# Patient Record
Sex: MALE | Race: WHITE | NOT HISPANIC OR LATINO | URBAN - NONMETROPOLITAN AREA
[De-identification: names, ages, dates, MRNs, and addresses within clinical notes are randomized per-mention and may not be internally consistent; named-entity substitution may affect disease eponyms.]

---

## 2023-12-04 DIAGNOSIS — F41.8 ANXIETY WITH DEPRESSION: ICD-10-CM

## 2023-12-05 RX ORDER — BUPROPION HYDROCHLORIDE 150 MG/1
150 TABLET ORAL
Qty: 90 TABLET | Refills: 3 | Status: SHIPPED | OUTPATIENT
Start: 2023-12-05 | End: 2024-06-10 | Stop reason: ALTCHOICE

## 2023-12-11 ENCOUNTER — OFFICE VISIT (OUTPATIENT)
Dept: PRIMARY CARE | Facility: CLINIC | Age: 53
End: 2023-12-11
Payer: COMMERCIAL

## 2023-12-11 VITALS
HEART RATE: 78 BPM | HEIGHT: 70 IN | WEIGHT: 182.4 LBS | BODY MASS INDEX: 26.11 KG/M2 | TEMPERATURE: 98 F | OXYGEN SATURATION: 97 % | DIASTOLIC BLOOD PRESSURE: 78 MMHG | SYSTOLIC BLOOD PRESSURE: 128 MMHG

## 2023-12-11 DIAGNOSIS — R53.83 TIRED: ICD-10-CM

## 2023-12-11 DIAGNOSIS — Z13.6 SCREENING FOR HEART DISEASE: ICD-10-CM

## 2023-12-11 DIAGNOSIS — J01.00 ACUTE NON-RECURRENT MAXILLARY SINUSITIS: Primary | ICD-10-CM

## 2023-12-11 PROCEDURE — 99214 OFFICE O/P EST MOD 30 MIN: CPT | Performed by: FAMILY MEDICINE

## 2023-12-11 PROCEDURE — 1036F TOBACCO NON-USER: CPT | Performed by: FAMILY MEDICINE

## 2023-12-11 RX ORDER — ONDANSETRON 4 MG/1
4 TABLET, ORALLY DISINTEGRATING ORAL EVERY 8 HOURS PRN
COMMUNITY
Start: 2023-11-02

## 2023-12-11 RX ORDER — TAMSULOSIN HYDROCHLORIDE 0.4 MG/1
0.4 CAPSULE ORAL NIGHTLY
COMMUNITY

## 2023-12-11 RX ORDER — FERROUS SULFATE 325(65) MG
325 TABLET ORAL
COMMUNITY

## 2023-12-11 RX ORDER — TADALAFIL 20 MG/1
20 TABLET ORAL DAILY PRN
COMMUNITY
End: 2024-06-10 | Stop reason: SDUPTHER

## 2023-12-11 RX ORDER — OLMESARTAN MEDOXOMIL AND HYDROCHLOROTHIAZIDE 40/25 40; 25 MG/1; MG/1
1 TABLET ORAL
COMMUNITY
Start: 2017-11-06 | End: 2024-02-07 | Stop reason: ALTCHOICE

## 2023-12-11 RX ORDER — AZITHROMYCIN 500 MG/1
500 TABLET, FILM COATED ORAL DAILY
Qty: 5 TABLET | Refills: 0 | Status: SHIPPED | OUTPATIENT
Start: 2023-12-11 | End: 2023-12-16

## 2023-12-11 ASSESSMENT — ENCOUNTER SYMPTOMS
SINUS PRESSURE: 1
ENDOCRINE NEGATIVE: 1
LIGHT-HEADEDNESS: 1
DIFFICULTY URINATING: 0
SINUS PAIN: 1
DIZZINESS: 0
FATIGUE: 1
FEVER: 0
SHORTNESS OF BREATH: 0
NAUSEA: 0
RHINORRHEA: 1
DIARRHEA: 0

## 2023-12-11 ASSESSMENT — PATIENT HEALTH QUESTIONNAIRE - PHQ9
2. FEELING DOWN, DEPRESSED OR HOPELESS: NOT AT ALL
SUM OF ALL RESPONSES TO PHQ9 QUESTIONS 1 AND 2: 0
1. LITTLE INTEREST OR PLEASURE IN DOING THINGS: NOT AT ALL

## 2023-12-11 ASSESSMENT — PAIN SCALES - GENERAL: PAINLEVEL: 0-NO PAIN

## 2023-12-11 NOTE — PROGRESS NOTES
"Subjective   Patient ID: Marlon Stoddard is a 53 y.o. male who presents for Dizziness, Hypotension (Happened 1 time while going from a kneeling to a standing position-lightheaded, dizziness-low BP 3 weeks ago/Patient has not been taking his BP med's for 3 weeks), and Sinusitis (X 3 days).    He was taking BP meds in past, now Blood pressure was running low with dizziness etc.  He is stable without those meds.  He did stop EtOH also.  He looks to be in great shape and seems to be in great spirits  Tired  No major depression  Nasal congestion, post  nasal drip, sinus symptoms over the last week         Review of Systems   Constitutional:  Positive for fatigue. Negative for fever.        Also see HPI   HENT:  Positive for congestion, postnasal drip, rhinorrhea, sinus pressure and sinus pain.    Eyes:  Negative for visual disturbance.   Respiratory:  Negative for shortness of breath.    Cardiovascular:  Negative for chest pain.   Gastrointestinal:  Negative for diarrhea and nausea.   Endocrine: Negative.    Genitourinary:  Negative for difficulty urinating.   Skin:  Negative for rash.   Neurological:  Positive for light-headedness. Negative for dizziness.        No focal deficits   Psychiatric/Behavioral:  Negative for suicidal ideas.    All other systems reviewed and are negative.      Objective   /78   Pulse 78   Temp 36.7 °C (98 °F)   Ht 1.778 m (5' 10\")   Wt 82.7 kg (182 lb 6.4 oz)   SpO2 97%   BMI 26.17 kg/m²     Physical Exam  Vitals and nursing note reviewed.   Constitutional:       Appearance: Normal appearance.   HENT:      Head: Normocephalic and atraumatic.      Comments: Maxillary sinus tenderness bilaterally     Nose: Congestion and rhinorrhea present.   Eyes:      Extraocular Movements: Extraocular movements intact.      Conjunctiva/sclera: Conjunctivae normal.   Cardiovascular:      Rate and Rhythm: Normal rate and regular rhythm.      Heart sounds: Normal heart sounds.   Pulmonary:      " Effort: Pulmonary effort is normal.      Breath sounds: Normal breath sounds.      Comments: Lungs essentially CTA b/l  Abdominal:      General: There is no distension.      Palpations: Abdomen is soft. There is no mass.      Tenderness: There is no abdominal tenderness.   Musculoskeletal:      Cervical back: Neck supple.      Right lower leg: No edema.      Left lower leg: No edema.   Skin:     Coloration: Skin is not jaundiced.      Findings: No rash.   Neurological:      General: No focal deficit present.      Mental Status: He is alert and oriented to person, place, and time.   Psychiatric:         Mood and Affect: Mood normal.         Behavior: Behavior normal.         Thought Content: Thought content normal.         Judgment: Judgment normal.         Assessment/Plan   Problem List Items Addressed This Visit    None  Visit Diagnoses         Codes    Acute non-recurrent maxillary sinusitis    -  Primary J01.00    Relevant Medications    azithromycin (Zithromax) 500 mg tablet    Screening for heart disease     Z13.6    Relevant Orders    Lipid Panel    Tired     R53.83    Relevant Orders    Comprehensive Metabolic Panel    TSH with reflex to Free T4 if abnormal    Vitamin B12    CBC and Auto Differential

## 2023-12-29 ENCOUNTER — LAB (OUTPATIENT)
Dept: LAB | Facility: LAB | Age: 53
End: 2023-12-29
Payer: COMMERCIAL

## 2023-12-29 DIAGNOSIS — R53.83 TIRED: ICD-10-CM

## 2023-12-29 DIAGNOSIS — D50.0 IRON DEFICIENCY ANEMIA DUE TO CHRONIC BLOOD LOSS: ICD-10-CM

## 2023-12-29 DIAGNOSIS — Z13.6 SCREENING FOR HEART DISEASE: ICD-10-CM

## 2023-12-29 LAB
ALBUMIN SERPL BCP-MCNC: 4.2 G/DL (ref 3.4–5)
ALP SERPL-CCNC: 84 U/L (ref 33–120)
ALT SERPL W P-5'-P-CCNC: 17 U/L (ref 10–52)
ANION GAP SERPL CALC-SCNC: 10 MMOL/L (ref 10–20)
AST SERPL W P-5'-P-CCNC: 13 U/L (ref 9–39)
BASOPHILS # BLD AUTO: 0.09 X10*3/UL (ref 0–0.1)
BASOPHILS NFR BLD AUTO: 1.6 %
BILIRUB SERPL-MCNC: 0.5 MG/DL (ref 0–1.2)
BUN SERPL-MCNC: 12 MG/DL (ref 6–23)
CALCIUM SERPL-MCNC: 9.1 MG/DL (ref 8.6–10.6)
CHLORIDE SERPL-SCNC: 105 MMOL/L (ref 98–107)
CHOLEST SERPL-MCNC: 193 MG/DL (ref 0–199)
CHOLESTEROL/HDL RATIO: 3.6
CO2 SERPL-SCNC: 31 MMOL/L (ref 21–32)
CREAT SERPL-MCNC: 0.87 MG/DL (ref 0.5–1.3)
EOSINOPHIL # BLD AUTO: 0.55 X10*3/UL (ref 0–0.7)
EOSINOPHIL NFR BLD AUTO: 9.6 %
ERYTHROCYTE [DISTWIDTH] IN BLOOD BY AUTOMATED COUNT: 13.5 % (ref 11.5–14.5)
GFR SERPL CREATININE-BSD FRML MDRD: >90 ML/MIN/1.73M*2
GLUCOSE SERPL-MCNC: 97 MG/DL (ref 74–99)
HCT VFR BLD AUTO: 38.9 % (ref 41–52)
HDLC SERPL-MCNC: 53.3 MG/DL
HGB BLD-MCNC: 12.6 G/DL (ref 13.5–17.5)
IMM GRANULOCYTES # BLD AUTO: 0.01 X10*3/UL (ref 0–0.7)
IMM GRANULOCYTES NFR BLD AUTO: 0.2 % (ref 0–0.9)
LDLC SERPL CALC-MCNC: 121 MG/DL
LYMPHOCYTES # BLD AUTO: 1.76 X10*3/UL (ref 1.2–4.8)
LYMPHOCYTES NFR BLD AUTO: 30.8 %
MCH RBC QN AUTO: 26.5 PG (ref 26–34)
MCHC RBC AUTO-ENTMCNC: 32.4 G/DL (ref 32–36)
MCV RBC AUTO: 82 FL (ref 80–100)
MONOCYTES # BLD AUTO: 0.52 X10*3/UL (ref 0.1–1)
MONOCYTES NFR BLD AUTO: 9.1 %
NEUTROPHILS # BLD AUTO: 2.79 X10*3/UL (ref 1.2–7.7)
NEUTROPHILS NFR BLD AUTO: 48.7 %
NON HDL CHOLESTEROL: 140 MG/DL (ref 0–149)
NRBC BLD-RTO: 0 /100 WBCS (ref 0–0)
PLATELET # BLD AUTO: 269 X10*3/UL (ref 150–450)
POTASSIUM SERPL-SCNC: 3.5 MMOL/L (ref 3.5–5.3)
PROT SERPL-MCNC: 6.2 G/DL (ref 6.4–8.2)
RBC # BLD AUTO: 4.75 X10*6/UL (ref 4.5–5.9)
SODIUM SERPL-SCNC: 142 MMOL/L (ref 136–145)
TRIGL SERPL-MCNC: 94 MG/DL (ref 0–149)
TSH SERPL-ACNC: 1.66 MIU/L (ref 0.44–3.98)
VIT B12 SERPL-MCNC: 360 PG/ML (ref 211–911)
VLDL: 19 MG/DL (ref 0–40)
WBC # BLD AUTO: 5.7 X10*3/UL (ref 4.4–11.3)

## 2023-12-29 PROCEDURE — 84443 ASSAY THYROID STIM HORMONE: CPT

## 2023-12-29 PROCEDURE — 82607 VITAMIN B-12: CPT

## 2023-12-29 PROCEDURE — 80061 LIPID PANEL: CPT

## 2023-12-29 PROCEDURE — 80053 COMPREHEN METABOLIC PANEL: CPT

## 2023-12-29 PROCEDURE — 36415 COLL VENOUS BLD VENIPUNCTURE: CPT

## 2023-12-29 PROCEDURE — 85025 COMPLETE CBC W/AUTO DIFF WBC: CPT

## 2024-02-06 NOTE — PROGRESS NOTES
patient ID: Marlon Stoddard is a 53 y.o. male.    Subjective    HPI  Marlon Stoddard follows up for mild normocytic anemia and fatigue. Laboratory work at the last visit revealed an elevated TIBC suggestive of iron deficiency. Patient denies pagophagia but does have fatigue, chills, and does bite his nails. Fatigue is better but suboptimal    He denies mouth sores. Patient reports a family history of iron deficiency.   He denies diarrhea or bloody stools but does have a history of gastric ulcers.   Tolerating oral iron.      53-year-old man with a history of hypertension, generalized anxiety disorder, degenerative disc disease, vitamin D deficiency, erectile dysfunction due to arterial  insufficiency, gastritis, chronic pain, overweight, referred for evaluation of anemia.     Chief complaint: Fatigue  He presents with a history of chills, which are very intense.  He even saw a neurologist to evaluate for seizures, because of shaking.  He knows when these shakes are coming.  He has a warning of approximately five seconds, and the shakes will last 1-5  minutes.  He has no loss of consciousness when the shakes occur.  The shakes involve his whole body.  His legs will get cold.  His wife reports that he is not cold to the touch.      the most frequent have been 2-3 attacks of chills per day.  He notes that dampness will bring on these chill attacks.  Atmospheric temperature changes affects it.    He notes that he can have some itching, though more like numbness, but no rash develops.  He has no flushing (except if drinking rum, though not with eating cheese).    He has no allergies and no runny nose.       3/7/2023: Hemoglobin 12.7, hematocrit 38.6, MCV 81.3 normal white count platelet count and differential   Sed rate 6(2/1/2020)        97, TIBC 348, 34.5% iron saturation   B12 401   12/10/2022: Hemoglobin 12.3, hematocrit 38.6, RBC 4.69, normal white blood count platelet count and differential   9/2/2022: Hemoglobin 12.3,  hematocrit 37.6   PSA 1.6   Uric acid 6.6   Vitamin D 32     First available CBC: 1/18/2017: Hemoglobin 13.4   Anemia has always been present since then   LFTs normal   Creatinine 0.9   PEBBLES(2/1/2020) normal        Past surgical history:   Tonsillectomy   Bunionectomy   Vasectomy   Back disc at C6(1/27/2017)    Objective    BSA: There is no height or weight on file to calculate BSA.  There were no vitals taken for this visit.   Review of Systems   Constitutional:  Positive for chills and fatigue. Negative for fever.   HENT:  Negative for nosebleeds.    Eyes:  Negative for visual disturbance.   Respiratory:  Positive for shortness of breath. Negative for cough.    Gastrointestinal:  Negative for blood in stool, constipation and diarrhea.   Endocrine: Positive for cold intolerance.   Genitourinary:  Negative for hematuria.   Musculoskeletal:  Negative for arthralgias.   Skin:  Negative for rash.   Allergic/Immunologic: Negative for environmental allergies.   Neurological:  Negative for light-headedness.   Hematological:  Does not bruise/bleed easily.   Psychiatric/Behavioral:  Negative for sleep disturbance.        Physical Exam  Constitutional:       Appearance: Normal appearance.   HENT:      Head: Normocephalic and atraumatic.      Right Ear: External ear normal.      Left Ear: External ear normal.      Mouth/Throat:      Mouth: Mucous membranes are moist.      Pharynx: Oropharynx is clear.   Eyes:      Extraocular Movements: Extraocular movements intact.      Pupils: Pupils are equal, round, and reactive to light.   Cardiovascular:      Rate and Rhythm: Normal rate and regular rhythm.      Heart sounds: No murmur heard.     No friction rub. No gallop.   Pulmonary:      Effort: Pulmonary effort is normal.      Breath sounds: Normal breath sounds.   Abdominal:      General: Abdomen is flat. Bowel sounds are normal.   Musculoskeletal:         General: Normal range of motion.      Cervical back: Normal range of motion.  "  Skin:     General: Skin is warm and dry.   Neurological:      General: No focal deficit present.      Mental Status: He is alert and oriented to person, place, and time.   Psychiatric:         Mood and Affect: Mood normal.         Behavior: Behavior normal.             Assessment/Plan   SUMMARY: 53-year-old man with a history of hypertension, generalized anxiety disorder, degenerative disc disease, vitamin D deficiency, erectile dysfunction due to arterial insufficiency, gastritis, chronic pain, overweight, referred for evaluation of  anemia.  an isolated mild normocytic anemia , without deficit in the white blood count, differential or platelet count, has been consistently present since first available CBC: 1/18/2017: Hemoglobin 13.4.     Mild normocytic anemia   Date of onset: since first available CBC: 1/18/2017: Hemoglobin 13.4.    MCV: normal.  Mild.  Stable.     Associated CBC findings: none.    Hgb deficit: yes.  slight (MCHC 32.9)   Ferritin: 62 (5-2-23). Iron saturation: 19%.  TIBC: 451.    [meets criteria for iron deficiency]   Creatinine: 0.9    GI bleeding history: no.  GI bleeding symptoms: no.  Colonoscopy:2020 .  EGD: .      Transfusion history: not transfused    Hemolysis panel: reticulocytes: 1.5%.  LDH: 164.  Haptoglobin: 229.    CT abdomen pelvis(+)(7/10/2018) liver and spleen unremarkable   Nutrition panel: B12: 426. Methylmalonic acid: 0.15.  Copper level: not done. ,     Inflammation panel: ESR: not done.  Rheumatoid factor: not done.  PEBBLES: not done.  CRP: not done.    Myeloma panel: SPEP/ROSALBA: not done.  FLC: not done.      flow cytometry: not done.    TSH: 1.66.    Celiac panel negative   Family history: mother with pernicious anemia   no other blood disorders   [sister with Parkinsons]   Impression: iron deficiency   PLAN:   Oral iron   GI consult in progress  RTC in 4 months           Fatigue, since October, 2022.  Sleep hours 8, but wake up tired  Wake up 7:45, \"a zombie until 10 " "am\"  some joint issues.   Covid in December 2021  Feels SOB.  difficulty with walkup up steps  not tired sitting.   no history of VTE  not seen a pulmonologist.    He presents with a history of chills, which are very intense.  He even saw a neurologist to evaluate for seizures, because of shaking.  He knows when these shakes are coming.  He has a warning of approximately five seconds, and the shakes will last 1-5  minutes.  He has no loss of consciousness when the shakes occur.  The shakes involve his whole body.  His legs will get cold.  His wife reports that he is not cold to the touch.      the most frequent have been 2-3 attacks of chills per day.  He notes that dampness will bring on these chill attacks.  Atmospheric temperature changes affects it.    He notes that he can have some itching, though more like numbness, but no rash develops.  He has no flushing (except if drinking rum, though not with eating cheese).    He has no allergies and no runny nose.    no bowel issues (except milk, but can eat cheese)  Contributing factors:  Pain: no   Depression: no (aside from tiredness)  Anxiety  Emotional distress: no  Sleep disturbance: some waking up in the night.  wake 2 am   Nutritional deficit  Activity level: reduced because of  Anemia  Hot flashes?   Medication adverse effects (what are classics?) wellburtin  Co-morbidities (RA):   a. Thyroid?  b. B12?  c. Vitamin D? level 32  d. Low testosterone?  e. Low iron? ferritin 92  summary: history of cold senstivity leading to shivering/shaking episodes that lead to fatigue.  History of SOB-related fatigue since October, 2022.    Tryptase 3.6   Flow cytometry (5-2-23) normal   IgG 821, IgA 285, IgM 92   Kappa 1.62, lambda 1.52, ratio 1.07   SPEP: no M   ESR 16   Plasma metanephrins undetectable   Cyroglobulin negative   HCV nonreactive   IgE 10   C3 148   TSH 0.97   VMA 2.9   Anillylmandelic acid, urine randome 3.6 N   5HIAA (5 hydroxyindole acetic acid) 2.7   PLAN: "   Iron therapy       Shortness of breath, was never severe.  unchanged  Patient denies vague moments of shortness of breath  Associated with some sweating.   Former smoker  No chest imaging available   He describes having Birds in his house.  He does not shower without bleaching the showerhead's   Considerations include histoplasmosis, coccidiomycosis, LARISSA  , COPD  Plan:   Histoplasmosis and coccidiomycosis titers  Pulmonologist consult [not happen]  Recommend bleaching the showerhead monthly       1 cm nodule left upper eyelid  Ophthalmology referral         Kenneth Fox MD

## 2024-02-07 ENCOUNTER — OFFICE VISIT (OUTPATIENT)
Dept: HEMATOLOGY/ONCOLOGY | Facility: CLINIC | Age: 54
End: 2024-02-07
Payer: COMMERCIAL

## 2024-02-07 ENCOUNTER — LAB (OUTPATIENT)
Dept: LAB | Facility: CLINIC | Age: 54
End: 2024-02-07
Payer: COMMERCIAL

## 2024-02-07 VITALS
OXYGEN SATURATION: 97 % | WEIGHT: 184.75 LBS | SYSTOLIC BLOOD PRESSURE: 137 MMHG | TEMPERATURE: 97.3 F | HEART RATE: 76 BPM | BODY MASS INDEX: 26.51 KG/M2 | DIASTOLIC BLOOD PRESSURE: 83 MMHG | RESPIRATION RATE: 18 BRPM

## 2024-02-07 DIAGNOSIS — D50.0 IRON DEFICIENCY ANEMIA DUE TO CHRONIC BLOOD LOSS: Primary | ICD-10-CM

## 2024-02-07 DIAGNOSIS — D50.0 IRON DEFICIENCY ANEMIA DUE TO CHRONIC BLOOD LOSS: ICD-10-CM

## 2024-02-07 LAB
ALBUMIN SERPL BCP-MCNC: 4.1 G/DL (ref 3.4–5)
ALP SERPL-CCNC: 75 U/L (ref 33–120)
ALT SERPL W P-5'-P-CCNC: 16 U/L (ref 10–52)
ANION GAP SERPL CALC-SCNC: 13 MMOL/L (ref 10–20)
AST SERPL W P-5'-P-CCNC: 14 U/L (ref 9–39)
BASOPHILS # BLD AUTO: 0.06 X10*3/UL (ref 0–0.1)
BASOPHILS NFR BLD AUTO: 1 %
BILIRUB SERPL-MCNC: 0.5 MG/DL (ref 0–1.2)
BUN SERPL-MCNC: 12 MG/DL (ref 6–23)
CALCIUM SERPL-MCNC: 8.7 MG/DL (ref 8.6–10.3)
CHLORIDE SERPL-SCNC: 104 MMOL/L (ref 98–107)
CO2 SERPL-SCNC: 27 MMOL/L (ref 21–32)
CREAT SERPL-MCNC: 0.89 MG/DL (ref 0.5–1.3)
EGFRCR SERPLBLD CKD-EPI 2021: >90 ML/MIN/1.73M*2
EOSINOPHIL # BLD AUTO: 0.39 X10*3/UL (ref 0–0.7)
EOSINOPHIL NFR BLD AUTO: 6.6 %
ERYTHROCYTE [DISTWIDTH] IN BLOOD BY AUTOMATED COUNT: 13.7 % (ref 11.5–14.5)
FERRITIN SERPL-MCNC: 52 NG/ML (ref 20–300)
GLUCOSE SERPL-MCNC: 144 MG/DL (ref 74–99)
HCT VFR BLD AUTO: 39.9 % (ref 41–52)
HGB BLD-MCNC: 12.9 G/DL (ref 13.5–17.5)
IMM GRANULOCYTES # BLD AUTO: 0.02 X10*3/UL (ref 0–0.7)
IMM GRANULOCYTES NFR BLD AUTO: 0.3 % (ref 0–0.9)
IRON SATN MFR SERPL: 44 % (ref 25–45)
IRON SERPL-MCNC: 170 UG/DL (ref 35–150)
LYMPHOCYTES # BLD AUTO: 1.73 X10*3/UL (ref 1.2–4.8)
LYMPHOCYTES NFR BLD AUTO: 29.2 %
MCH RBC QN AUTO: 25.5 PG (ref 26–34)
MCHC RBC AUTO-ENTMCNC: 32.3 G/DL (ref 32–36)
MCV RBC AUTO: 79 FL (ref 80–100)
MONOCYTES # BLD AUTO: 0.44 X10*3/UL (ref 0.1–1)
MONOCYTES NFR BLD AUTO: 7.4 %
NEUTROPHILS # BLD AUTO: 3.29 X10*3/UL (ref 1.2–7.7)
NEUTROPHILS NFR BLD AUTO: 55.5 %
NRBC BLD-RTO: 0 /100 WBCS (ref 0–0)
PLATELET # BLD AUTO: 233 X10*3/UL (ref 150–450)
POTASSIUM SERPL-SCNC: 3.5 MMOL/L (ref 3.5–5.3)
PROT SERPL-MCNC: 6.3 G/DL (ref 6.4–8.2)
RBC # BLD AUTO: 5.06 X10*6/UL (ref 4.5–5.9)
SODIUM SERPL-SCNC: 140 MMOL/L (ref 136–145)
TIBC SERPL-MCNC: 388 UG/DL (ref 240–445)
UIBC SERPL-MCNC: 218 UG/DL (ref 110–370)
WBC # BLD AUTO: 5.9 X10*3/UL (ref 4.4–11.3)

## 2024-02-07 PROCEDURE — 85025 COMPLETE CBC W/AUTO DIFF WBC: CPT

## 2024-02-07 PROCEDURE — 99214 OFFICE O/P EST MOD 30 MIN: CPT | Performed by: INTERNAL MEDICINE

## 2024-02-07 PROCEDURE — 83540 ASSAY OF IRON: CPT

## 2024-02-07 PROCEDURE — 36415 COLL VENOUS BLD VENIPUNCTURE: CPT

## 2024-02-07 PROCEDURE — 80053 COMPREHEN METABOLIC PANEL: CPT

## 2024-02-07 PROCEDURE — 82728 ASSAY OF FERRITIN: CPT

## 2024-02-07 PROCEDURE — 1036F TOBACCO NON-USER: CPT | Performed by: INTERNAL MEDICINE

## 2024-02-07 ASSESSMENT — ENCOUNTER SYMPTOMS
BRUISES/BLEEDS EASILY: 0
LIGHT-HEADEDNESS: 0
SLEEP DISTURBANCE: 0
HEMATURIA: 0
CONSTIPATION: 0
BLOOD IN STOOL: 0
FATIGUE: 1
CHILLS: 1
DEPRESSION: 0
COUGH: 0
SHORTNESS OF BREATH: 1
DIARRHEA: 0
OCCASIONAL FEELINGS OF UNSTEADINESS: 0
LOSS OF SENSATION IN FEET: 0
ARTHRALGIAS: 0
FEVER: 0

## 2024-02-07 ASSESSMENT — PAIN SCALES - GENERAL: PAINLEVEL: 0-NO PAIN

## 2024-02-07 ASSESSMENT — COLUMBIA-SUICIDE SEVERITY RATING SCALE - C-SSRS
6. HAVE YOU EVER DONE ANYTHING, STARTED TO DO ANYTHING, OR PREPARED TO DO ANYTHING TO END YOUR LIFE?: NO
1. IN THE PAST MONTH, HAVE YOU WISHED YOU WERE DEAD OR WISHED YOU COULD GO TO SLEEP AND NOT WAKE UP?: NO
2. HAVE YOU ACTUALLY HAD ANY THOUGHTS OF KILLING YOURSELF?: NO

## 2024-02-07 ASSESSMENT — PATIENT HEALTH QUESTIONNAIRE - PHQ9
SUM OF ALL RESPONSES TO PHQ9 QUESTIONS 1 AND 2: 0
2. FEELING DOWN, DEPRESSED OR HOPELESS: NOT AT ALL
1. LITTLE INTEREST OR PLEASURE IN DOING THINGS: NOT AT ALL

## 2024-02-07 NOTE — PROGRESS NOTES
Patient here alone for follow up with Dr. Fox; seen for anemia.    Reconciled and reviewed medication and allergy list with patient.     Confirmed patient is taking oral iron as per last visit.    Per MD, patient to be back in 4 months.    Reviewed AVS with patient.      No barriers to education noted, pt. Agreed to plan and verbalized understanding using teach back method.

## 2024-05-02 ENCOUNTER — TELEPHONE (OUTPATIENT)
Dept: PRIMARY CARE | Facility: CLINIC | Age: 54
End: 2024-05-02
Payer: COMMERCIAL
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
## 2024-05-02 DIAGNOSIS — L08.9 SKIN INFECTION: Primary | ICD-10-CM

## 2024-05-02 RX ORDER — ERGOCALCIFEROL 1.25 MG/1
CAPSULE ORAL
COMMUNITY
Start: 2020-02-12 | End: 2024-06-10 | Stop reason: WASHOUT

## 2024-05-03 RX ORDER — DOXYCYCLINE 100 MG/1
100 CAPSULE ORAL 2 TIMES DAILY
Qty: 28 CAPSULE | Refills: 0 | Status: SHIPPED | OUTPATIENT
Start: 2024-05-03 | End: 2024-05-17

## 2024-05-28 ENCOUNTER — HOSPITAL ENCOUNTER (OUTPATIENT)
Dept: RADIOLOGY | Facility: HOSPITAL | Age: 54
Discharge: HOME | End: 2024-05-28
Payer: COMMERCIAL

## 2024-05-28 DIAGNOSIS — M72.2 PLANTAR FASCIAL FIBROMATOSIS: ICD-10-CM

## 2024-05-28 PROCEDURE — 73718 MRI LOWER EXTREMITY W/O DYE: CPT | Mod: LEFT SIDE | Performed by: RADIOLOGY

## 2024-05-28 PROCEDURE — 73718 MRI LOWER EXTREMITY W/O DYE: CPT | Mod: LT

## 2024-06-07 RX ORDER — OLMESARTAN MEDOXOMIL AND HYDROCHLOROTHIAZIDE 40/25 40; 25 MG/1; MG/1
1 TABLET ORAL DAILY
COMMUNITY
End: 2024-06-10 | Stop reason: WASHOUT

## 2024-06-07 RX ORDER — NAPROXEN 500 MG/1
500 TABLET ORAL
COMMUNITY
Start: 2024-05-02

## 2024-06-10 ENCOUNTER — LAB (OUTPATIENT)
Dept: LAB | Facility: LAB | Age: 54
End: 2024-06-10
Payer: COMMERCIAL

## 2024-06-10 ENCOUNTER — OFFICE VISIT (OUTPATIENT)
Dept: PRIMARY CARE | Facility: CLINIC | Age: 54
End: 2024-06-10
Payer: COMMERCIAL

## 2024-06-10 VITALS
TEMPERATURE: 98.6 F | DIASTOLIC BLOOD PRESSURE: 80 MMHG | HEART RATE: 84 BPM | BODY MASS INDEX: 26.23 KG/M2 | SYSTOLIC BLOOD PRESSURE: 130 MMHG | HEIGHT: 70 IN | OXYGEN SATURATION: 96 % | WEIGHT: 183.2 LBS

## 2024-06-10 DIAGNOSIS — N52.01 ERECTILE DYSFUNCTION DUE TO ARTERIAL INSUFFICIENCY: Primary | ICD-10-CM
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
## 2024-06-10 DIAGNOSIS — R53.83 TIRED: ICD-10-CM

## 2024-06-10 DIAGNOSIS — S30.861D TICK BITE OF ABDOMINAL WALL, SUBSEQUENT ENCOUNTER: ICD-10-CM

## 2024-06-10 DIAGNOSIS — L30.9 DERMATITIS: ICD-10-CM

## 2024-06-10 DIAGNOSIS — I10 PRIMARY HYPERTENSION: ICD-10-CM

## 2024-06-10 DIAGNOSIS — F41.9 ANXIETY: ICD-10-CM

## 2024-06-10 DIAGNOSIS — L08.9 SKIN INFECTION: ICD-10-CM

## 2024-06-10 DIAGNOSIS — R73.9 HYPERGLYCEMIA: ICD-10-CM

## 2024-06-10 LAB
ALBUMIN SERPL BCP-MCNC: 4.2 G/DL (ref 3.4–5)
ALP SERPL-CCNC: 86 U/L (ref 33–120)
ALT SERPL W P-5'-P-CCNC: 17 U/L (ref 10–52)
ANION GAP SERPL CALC-SCNC: 11 MMOL/L (ref 10–20)
AST SERPL W P-5'-P-CCNC: 15 U/L (ref 9–39)
BASOPHILS # BLD AUTO: 0.06 X10*3/UL (ref 0–0.1)
BASOPHILS NFR BLD AUTO: 0.9 %
BILIRUB SERPL-MCNC: 0.5 MG/DL (ref 0–1.2)
BUN SERPL-MCNC: 13 MG/DL (ref 6–23)
CALCIUM SERPL-MCNC: 9.3 MG/DL (ref 8.6–10.3)
CHLORIDE SERPL-SCNC: 103 MMOL/L (ref 98–107)
CHOLEST SERPL-MCNC: 204 MG/DL (ref 0–199)
CHOLESTEROL/HDL RATIO: 3.6
CO2 SERPL-SCNC: 32 MMOL/L (ref 21–32)
CREAT SERPL-MCNC: 0.85 MG/DL (ref 0.5–1.3)
EGFRCR SERPLBLD CKD-EPI 2021: >90 ML/MIN/1.73M*2
EOSINOPHIL # BLD AUTO: 0.31 X10*3/UL (ref 0–0.7)
EOSINOPHIL NFR BLD AUTO: 4.9 %
ERYTHROCYTE [DISTWIDTH] IN BLOOD BY AUTOMATED COUNT: 14.5 % (ref 11.5–14.5)
GLUCOSE SERPL-MCNC: 92 MG/DL (ref 74–99)
HCT VFR BLD AUTO: 41.7 % (ref 41–52)
HDLC SERPL-MCNC: 56.5 MG/DL
HGB BLD-MCNC: 13.6 G/DL (ref 13.5–17.5)
IMM GRANULOCYTES # BLD AUTO: 0.02 X10*3/UL (ref 0–0.7)
IMM GRANULOCYTES NFR BLD AUTO: 0.3 % (ref 0–0.9)
LDLC SERPL CALC-MCNC: 131 MG/DL
LYMPHOCYTES # BLD AUTO: 1.56 X10*3/UL (ref 1.2–4.8)
LYMPHOCYTES NFR BLD AUTO: 24.5 %
MCH RBC QN AUTO: 26.7 PG (ref 26–34)
MCHC RBC AUTO-ENTMCNC: 32.6 G/DL (ref 32–36)
MCV RBC AUTO: 82 FL (ref 80–100)
MONOCYTES # BLD AUTO: 0.61 X10*3/UL (ref 0.1–1)
MONOCYTES NFR BLD AUTO: 9.6 %
NEUTROPHILS # BLD AUTO: 3.8 X10*3/UL (ref 1.2–7.7)
NEUTROPHILS NFR BLD AUTO: 59.8 %
NON HDL CHOLESTEROL: 148 MG/DL (ref 0–149)
NRBC BLD-RTO: 0 /100 WBCS (ref 0–0)
PLATELET # BLD AUTO: 259 X10*3/UL (ref 150–450)
POTASSIUM SERPL-SCNC: 4 MMOL/L (ref 3.5–5.3)
PROT SERPL-MCNC: 6.2 G/DL (ref 6.4–8.2)
RBC # BLD AUTO: 5.1 X10*6/UL (ref 4.5–5.9)
SODIUM SERPL-SCNC: 142 MMOL/L (ref 136–145)
TRIGL SERPL-MCNC: 82 MG/DL (ref 0–149)
URATE SERPL-MCNC: 5.9 MG/DL (ref 4–7.5)
VLDL: 16 MG/DL (ref 0–40)
WBC # BLD AUTO: 6.4 X10*3/UL (ref 4.4–11.3)

## 2024-06-10 PROCEDURE — 3075F SYST BP GE 130 - 139MM HG: CPT | Performed by: FAMILY MEDICINE

## 2024-06-10 PROCEDURE — 83036 HEMOGLOBIN GLYCOSYLATED A1C: CPT

## 2024-06-10 PROCEDURE — 87476 LYME DIS DNA AMP PROBE: CPT

## 2024-06-10 PROCEDURE — 36415 COLL VENOUS BLD VENIPUNCTURE: CPT

## 2024-06-10 PROCEDURE — 1036F TOBACCO NON-USER: CPT | Performed by: FAMILY MEDICINE

## 2024-06-10 PROCEDURE — 99214 OFFICE O/P EST MOD 30 MIN: CPT | Performed by: FAMILY MEDICINE

## 2024-06-10 PROCEDURE — 3079F DIAST BP 80-89 MM HG: CPT | Performed by: FAMILY MEDICINE

## 2024-06-10 RX ORDER — PREDNISONE 10 MG/1
TABLET ORAL DAILY
Qty: 21 TABLET | Refills: 0 | Status: SHIPPED | OUTPATIENT
Start: 2024-06-10 | End: 2024-06-15

## 2024-06-10 RX ORDER — BUPROPION HYDROCHLORIDE 75 MG/1
TABLET ORAL 2 TIMES DAILY
Qty: 54 TABLET | Refills: 0 | Status: SHIPPED | OUTPATIENT
Start: 2024-06-10 | End: 2024-08-05

## 2024-06-10 RX ORDER — TADALAFIL 20 MG/1
20 TABLET ORAL DAILY PRN
Qty: 30 TABLET | Refills: 5 | Status: SHIPPED | OUTPATIENT
Start: 2024-06-10

## 2024-06-10 ASSESSMENT — ENCOUNTER SYMPTOMS
HYPERTENSION: 1
OCCASIONAL FEELINGS OF UNSTEADINESS: 0
FEVER: 0
DIZZINESS: 0
LOSS OF SENSATION IN FEET: 0
DEPRESSION: 0
SHORTNESS OF BREATH: 0
NAUSEA: 0
DIARRHEA: 0
ENDOCRINE NEGATIVE: 1
DIFFICULTY URINATING: 0

## 2024-06-10 ASSESSMENT — PAIN SCALES - GENERAL: PAINLEVEL: 0-NO PAIN

## 2024-06-10 NOTE — PROGRESS NOTES
"Subjective   Patient ID: Marlon Stoddard is a 54 y.o. male who presents for Hypertension (6 month follow up) and tick bite follow up.    HTN-stable, no bp meds  Tick bite  Erectile dysfunction  Poison ivy on both arms 3 weeks  Anxiety wants to wean from wellbutrin xl    Hypertension  Pertinent negatives include no chest pain or shortness of breath.        Review of Systems   Constitutional:  Negative for fever.        Also see HPI   Eyes:  Negative for visual disturbance.   Respiratory:  Negative for shortness of breath.    Cardiovascular:  Negative for chest pain.   Gastrointestinal:  Negative for diarrhea and nausea.   Endocrine: Negative.    Genitourinary:  Negative for difficulty urinating.   Skin:  Negative for rash.   Neurological:  Negative for dizziness.        No focal deficits   Psychiatric/Behavioral:  Negative for suicidal ideas.    All other systems reviewed and are negative.      Objective   /80   Pulse 84   Temp 37 °C (98.6 °F)   Ht 1.778 m (5' 10\")   Wt 83.1 kg (183 lb 3.2 oz)   SpO2 96%   BMI 26.29 kg/m²     Physical Exam  Vitals and nursing note reviewed.   Constitutional:       Appearance: Normal appearance.   HENT:      Head: Normocephalic and atraumatic.   Eyes:      Extraocular Movements: Extraocular movements intact.      Conjunctiva/sclera: Conjunctivae normal.   Cardiovascular:      Rate and Rhythm: Normal rate and regular rhythm.      Heart sounds: Normal heart sounds.   Pulmonary:      Effort: Pulmonary effort is normal.      Breath sounds: Normal breath sounds.      Comments: Lungs essentially CTA b/l  Abdominal:      General: There is no distension.      Palpations: Abdomen is soft. There is no mass.      Tenderness: There is no abdominal tenderness.   Musculoskeletal:      Right lower leg: No edema.      Left lower leg: No edema.   Skin:     Coloration: Skin is not jaundiced.      Findings: No rash.      Comments: Linear-- vesicular lesions on both arms   Neurological:      " General: No focal deficit present.      Mental Status: He is alert and oriented to person, place, and time.   Psychiatric:         Mood and Affect: Mood normal.         Behavior: Behavior normal.         Thought Content: Thought content normal.         Judgment: Judgment normal.         Assessment/Plan   Diagnoses and all orders for this visit:  Erectile dysfunction due to arterial insufficiency  -     tadalafil 20 mg tablet; Take 1 tablet (20 mg) by mouth once daily as needed for erectile dysfunction.  Anxiety  -     buPROPion (Wellbutrin) 75 mg tablet; Take 1 tablet (75 mg) by mouth 2 times a day for 14 days, THEN 1 tablet (75 mg) once daily for 14 days, THEN 1 tablet (75 mg) every other day for 14 days, THEN 1 tablet (75 mg) every 3 days for 14 days.  Dermatitis  -     predniSONE (Deltasone) 10 mg tablet; Take 6 tablets (60 mg) by mouth once daily for 1 day, THEN 5 tablets (50 mg) once daily for 1 day, THEN 4 tablets (40 mg) once daily for 1 day, THEN 3 tablets (30 mg) once daily for 1 day, THEN 2 tablets (20 mg) once daily for 1 day, THEN 1 tablet (10 mg) once daily for 1 day.

## 2024-06-11 LAB
EST. AVERAGE GLUCOSE BLD GHB EST-MCNC: 108 MG/DL
HBA1C MFR BLD: 5.4 %

## 2024-06-14 LAB
B BURGDOR DNA SPEC QL NAA+PROBE: NOT DETECTED
SPECIMEN SOURCE: NORMAL

## 2024-06-25 ENCOUNTER — APPOINTMENT (OUTPATIENT)
Dept: HEMATOLOGY/ONCOLOGY | Facility: CLINIC | Age: 54
End: 2024-06-25
Payer: COMMERCIAL

## 2024-09-03 ENCOUNTER — HOSPITAL ENCOUNTER (OUTPATIENT)
Dept: RADIOLOGY | Facility: HOSPITAL | Age: 54
Discharge: HOME | End: 2024-09-03
Payer: COMMERCIAL

## 2024-09-03 DIAGNOSIS — M47.26 OTHER SPONDYLOSIS WITH RADICULOPATHY, LUMBAR REGION: ICD-10-CM

## 2024-09-03 DIAGNOSIS — M51.36 OTHER INTERVERTEBRAL DISC DEGENERATION, LUMBAR REGION: ICD-10-CM

## 2024-09-03 DIAGNOSIS — S33.5XXA SPRAIN OF LIGAMENTS OF LUMBAR SPINE, INITIAL ENCOUNTER: ICD-10-CM

## 2024-09-03 PROCEDURE — 72148 MRI LUMBAR SPINE W/O DYE: CPT

## 2024-09-03 PROCEDURE — 72148 MRI LUMBAR SPINE W/O DYE: CPT | Performed by: RADIOLOGY

## 2024-11-27 ENCOUNTER — APPOINTMENT (OUTPATIENT)
Dept: RADIOLOGY | Facility: HOSPITAL | Age: 54
End: 2024-11-27
Payer: COMMERCIAL

## 2024-11-27 ENCOUNTER — OFFICE VISIT (OUTPATIENT)
Dept: URGENT CARE | Age: 54
End: 2024-11-27
Payer: COMMERCIAL

## 2024-11-27 ENCOUNTER — HOSPITAL ENCOUNTER (EMERGENCY)
Facility: HOSPITAL | Age: 54
Discharge: HOME | End: 2024-11-28
Attending: EMERGENCY MEDICINE
Payer: COMMERCIAL

## 2024-11-27 VITALS
WEIGHT: 180 LBS | OXYGEN SATURATION: 94 % | BODY MASS INDEX: 25.77 KG/M2 | SYSTOLIC BLOOD PRESSURE: 150 MMHG | HEART RATE: 71 BPM | HEIGHT: 70 IN | DIASTOLIC BLOOD PRESSURE: 87 MMHG | RESPIRATION RATE: 16 BRPM | TEMPERATURE: 98.1 F

## 2024-11-27 DIAGNOSIS — E87.6 HYPOKALEMIA: ICD-10-CM

## 2024-11-27 DIAGNOSIS — R10.30 LOWER ABDOMINAL PAIN: Primary | ICD-10-CM

## 2024-11-27 DIAGNOSIS — K57.92 DIVERTICULITIS: Primary | ICD-10-CM

## 2024-11-27 DIAGNOSIS — N40.0 ENLARGED PROSTATE: ICD-10-CM

## 2024-11-27 DIAGNOSIS — N30.00 ACUTE CYSTITIS WITHOUT HEMATURIA: ICD-10-CM

## 2024-11-27 LAB
ALBUMIN SERPL BCP-MCNC: 4.3 G/DL (ref 3.4–5)
ALP SERPL-CCNC: 82 U/L (ref 33–120)
ALT SERPL W P-5'-P-CCNC: 14 U/L (ref 10–52)
ANION GAP SERPL CALC-SCNC: 13 MMOL/L (ref 10–20)
APPEARANCE UR: CLEAR
AST SERPL W P-5'-P-CCNC: 14 U/L (ref 9–39)
BASOPHILS # BLD AUTO: 0.05 X10*3/UL (ref 0–0.1)
BASOPHILS NFR BLD AUTO: 0.6 %
BILIRUB SERPL-MCNC: 0.3 MG/DL (ref 0–1.2)
BILIRUB UR STRIP.AUTO-MCNC: ABNORMAL MG/DL
BUN SERPL-MCNC: 13 MG/DL (ref 6–23)
CALCIUM SERPL-MCNC: 8.9 MG/DL (ref 8.6–10.3)
CHLORIDE SERPL-SCNC: 105 MMOL/L (ref 98–107)
CO2 SERPL-SCNC: 26 MMOL/L (ref 21–32)
COLOR UR: ABNORMAL
CREAT SERPL-MCNC: 0.81 MG/DL (ref 0.5–1.3)
EGFRCR SERPLBLD CKD-EPI 2021: >90 ML/MIN/1.73M*2
EOSINOPHIL # BLD AUTO: 0.27 X10*3/UL (ref 0–0.7)
EOSINOPHIL NFR BLD AUTO: 3 %
ERYTHROCYTE [DISTWIDTH] IN BLOOD BY AUTOMATED COUNT: 13.9 % (ref 11.5–14.5)
GLUCOSE SERPL-MCNC: 83 MG/DL (ref 74–99)
GLUCOSE UR STRIP.AUTO-MCNC: NORMAL MG/DL
HCT VFR BLD AUTO: 39.9 % (ref 41–52)
HGB BLD-MCNC: 13.2 G/DL (ref 13.5–17.5)
IMM GRANULOCYTES # BLD AUTO: 0.02 X10*3/UL (ref 0–0.7)
IMM GRANULOCYTES NFR BLD AUTO: 0.2 % (ref 0–0.9)
KETONES UR STRIP.AUTO-MCNC: NEGATIVE MG/DL
LEUKOCYTE ESTERASE UR QL STRIP.AUTO: NEGATIVE
LYMPHOCYTES # BLD AUTO: 2.63 X10*3/UL (ref 1.2–4.8)
LYMPHOCYTES NFR BLD AUTO: 29.3 %
MCH RBC QN AUTO: 26.5 PG (ref 26–34)
MCHC RBC AUTO-ENTMCNC: 33.1 G/DL (ref 32–36)
MCV RBC AUTO: 80 FL (ref 80–100)
MONOCYTES # BLD AUTO: 0.78 X10*3/UL (ref 0.1–1)
MONOCYTES NFR BLD AUTO: 8.7 %
NEUTROPHILS # BLD AUTO: 5.23 X10*3/UL (ref 1.2–7.7)
NEUTROPHILS NFR BLD AUTO: 58.2 %
NITRITE UR QL STRIP.AUTO: ABNORMAL
NRBC BLD-RTO: 0 /100 WBCS (ref 0–0)
PH UR STRIP.AUTO: 6.5 [PH]
PLATELET # BLD AUTO: 234 X10*3/UL (ref 150–450)
POC APPEARANCE, URINE: CLEAR
POC BILIRUBIN, URINE: ABNORMAL
POC BLOOD, URINE: NEGATIVE
POC COLOR, URINE: ABNORMAL
POC GLUCOSE, URINE: NEGATIVE MG/DL
POC KETONES, URINE: NEGATIVE MG/DL
POC LEUKOCYTES, URINE: ABNORMAL
POC NITRITE,URINE: POSITIVE
POC PH, URINE: 6.5 PH
POC PROTEIN, URINE: NEGATIVE MG/DL
POC SPECIFIC GRAVITY, URINE: 1.01
POC UROBILINOGEN, URINE: 1 EU/DL
POTASSIUM SERPL-SCNC: 3.1 MMOL/L (ref 3.5–5.3)
PROT SERPL-MCNC: 6.8 G/DL (ref 6.4–8.2)
PROT UR STRIP.AUTO-MCNC: NEGATIVE MG/DL
RBC # BLD AUTO: 4.99 X10*6/UL (ref 4.5–5.9)
RBC # UR STRIP.AUTO: NEGATIVE /UL
RBC #/AREA URNS AUTO: NORMAL /HPF
SODIUM SERPL-SCNC: 141 MMOL/L (ref 136–145)
SP GR UR STRIP.AUTO: 1.01
UROBILINOGEN UR STRIP.AUTO-MCNC: ABNORMAL MG/DL
WBC # BLD AUTO: 9 X10*3/UL (ref 4.4–11.3)
WBC #/AREA URNS AUTO: NORMAL /HPF

## 2024-11-27 PROCEDURE — 87086 URINE CULTURE/COLONY COUNT: CPT | Mod: GENLAB | Performed by: EMERGENCY MEDICINE

## 2024-11-27 PROCEDURE — 36415 COLL VENOUS BLD VENIPUNCTURE: CPT | Performed by: EMERGENCY MEDICINE

## 2024-11-27 PROCEDURE — 74176 CT ABD & PELVIS W/O CONTRAST: CPT | Mod: FOREIGN READ | Performed by: RADIOLOGY

## 2024-11-27 PROCEDURE — 99285 EMERGENCY DEPT VISIT HI MDM: CPT | Mod: 25

## 2024-11-27 PROCEDURE — 85025 COMPLETE CBC W/AUTO DIFF WBC: CPT | Performed by: EMERGENCY MEDICINE

## 2024-11-27 PROCEDURE — 74176 CT ABD & PELVIS W/O CONTRAST: CPT

## 2024-11-27 PROCEDURE — 80053 COMPREHEN METABOLIC PANEL: CPT | Performed by: EMERGENCY MEDICINE

## 2024-11-27 PROCEDURE — 81001 URINALYSIS AUTO W/SCOPE: CPT | Performed by: EMERGENCY MEDICINE

## 2024-11-27 ASSESSMENT — ENCOUNTER SYMPTOMS
FLANK PAIN: 0
FREQUENCY: 0
FEVER: 0
NAUSEA: 0
CONFUSION: 0
ABDOMINAL DISTENTION: 0
DIZZINESS: 0
VOMITING: 0
COUGH: 0
WHEEZING: 0
BACK PAIN: 0
WEAKNESS: 0
ABDOMINAL PAIN: 1
CHILLS: 0
DYSURIA: 0
SORE THROAT: 0
FATIGUE: 0
HEMATURIA: 0
SHORTNESS OF BREATH: 0
CHEST TIGHTNESS: 0
DIARRHEA: 0

## 2024-11-27 ASSESSMENT — PAIN SCALES - GENERAL
PAINLEVEL_OUTOF10: 2
PAINLEVEL_OUTOF10: 2

## 2024-11-27 ASSESSMENT — COLUMBIA-SUICIDE SEVERITY RATING SCALE - C-SSRS
1. IN THE PAST MONTH, HAVE YOU WISHED YOU WERE DEAD OR WISHED YOU COULD GO TO SLEEP AND NOT WAKE UP?: NO
2. HAVE YOU ACTUALLY HAD ANY THOUGHTS OF KILLING YOURSELF?: NO
6. HAVE YOU EVER DONE ANYTHING, STARTED TO DO ANYTHING, OR PREPARED TO DO ANYTHING TO END YOUR LIFE?: NO

## 2024-11-27 ASSESSMENT — PAIN DESCRIPTION - PAIN TYPE: TYPE: ACUTE PAIN

## 2024-11-27 ASSESSMENT — PAIN - FUNCTIONAL ASSESSMENT: PAIN_FUNCTIONAL_ASSESSMENT: 0-10

## 2024-11-27 ASSESSMENT — PAIN DESCRIPTION - LOCATION: LOCATION: ABDOMEN

## 2024-11-28 VITALS
BODY MASS INDEX: 26.05 KG/M2 | DIASTOLIC BLOOD PRESSURE: 85 MMHG | TEMPERATURE: 97.6 F | RESPIRATION RATE: 16 BRPM | WEIGHT: 182 LBS | HEIGHT: 70 IN | OXYGEN SATURATION: 98 % | HEART RATE: 82 BPM | SYSTOLIC BLOOD PRESSURE: 124 MMHG

## 2024-11-28 LAB — HOLD SPECIMEN: NORMAL

## 2024-11-28 PROCEDURE — 2500000004 HC RX 250 GENERAL PHARMACY W/ HCPCS (ALT 636 FOR OP/ED): Performed by: EMERGENCY MEDICINE

## 2024-11-28 PROCEDURE — 96365 THER/PROPH/DIAG IV INF INIT: CPT

## 2024-11-28 PROCEDURE — 96375 TX/PRO/DX INJ NEW DRUG ADDON: CPT

## 2024-11-28 PROCEDURE — 2500000002 HC RX 250 W HCPCS SELF ADMINISTERED DRUGS (ALT 637 FOR MEDICARE OP, ALT 636 FOR OP/ED)

## 2024-11-28 RX ORDER — LEVOFLOXACIN 5 MG/ML
500 INJECTION, SOLUTION INTRAVENOUS ONCE
Status: COMPLETED | OUTPATIENT
Start: 2024-11-28 | End: 2024-11-28

## 2024-11-28 RX ORDER — IBUPROFEN 600 MG/1
600 TABLET ORAL EVERY 6 HOURS PRN
Qty: 20 TABLET | Refills: 0 | Status: SHIPPED | OUTPATIENT
Start: 2024-11-28 | End: 2024-12-05

## 2024-11-28 RX ORDER — ONDANSETRON 4 MG/1
4 TABLET, ORALLY DISINTEGRATING ORAL EVERY 8 HOURS PRN
Qty: 20 TABLET | Refills: 0 | Status: SHIPPED | OUTPATIENT
Start: 2024-11-28 | End: 2024-12-05

## 2024-11-28 RX ORDER — AMOXICILLIN AND CLAVULANATE POTASSIUM 875; 125 MG/1; MG/1
1 TABLET, FILM COATED ORAL EVERY 12 HOURS
Qty: 20 TABLET | Refills: 0 | Status: SHIPPED | OUTPATIENT
Start: 2024-11-28 | End: 2024-12-08

## 2024-11-28 RX ORDER — POTASSIUM CHLORIDE 20 MEQ/1
20 TABLET, EXTENDED RELEASE ORAL DAILY
Status: DISCONTINUED | OUTPATIENT
Start: 2024-11-29 | End: 2024-11-28 | Stop reason: HOSPADM

## 2024-11-28 RX ORDER — POTASSIUM CHLORIDE 20 MEQ/1
TABLET, EXTENDED RELEASE ORAL
Status: COMPLETED
Start: 2024-11-28 | End: 2024-11-28

## 2024-11-28 RX ORDER — KETOROLAC TROMETHAMINE 15 MG/ML
15 INJECTION, SOLUTION INTRAMUSCULAR; INTRAVENOUS ONCE
Status: COMPLETED | OUTPATIENT
Start: 2024-11-28 | End: 2024-11-28

## 2024-11-28 ASSESSMENT — PAIN DESCRIPTION - LOCATION: LOCATION: ABDOMEN

## 2024-11-28 ASSESSMENT — PAIN SCALES - GENERAL: PAINLEVEL_OUTOF10: 7

## 2024-11-28 NOTE — DISCHARGE INSTRUCTIONS
Clear liquids for 3 days advance diet as tolerated.  Follow-up with urology as per appointment to evaluate your enlarged prostate.

## 2024-11-28 NOTE — ED TRIAGE NOTES
"Pt ambulatory to ED c/o L sided lower abdominal pain starting around 1700 today, pt states the pain is worse when he puts pressure on it with his hand, pt has hx of enlarged prostate so he states \"I always have urinary issues but I feel more burning than usual,\" denies any N/V, diarrhea, CP, or SOB  "

## 2024-11-28 NOTE — PROGRESS NOTES
Subjective   Patient ID: Marlon Stoddard is a 54 y.o. male. They present today with a chief complaint of Abdominal Pain (Lower abdominal pain x today).    History of Present Illness  Patient is a 54-year-old male presenting to the clinic with abdominal pain.  Abdominal pain is over the midline lower and left side.  Abdominal pain worse to palpation.  Patient denies any nausea, vomiting, fever, chills, dysuria, urgency, frequency, back pain.  Patient states only abdominal pain worse to palpation currently 5 out of 10 in severity.      Abdominal Pain  Pertinent negatives include no diarrhea, dysuria, fever, frequency, hematuria, nausea or vomiting.       Past Medical History  Allergies as of 11/27/2024    (No Known Allergies)       (Not in a hospital admission)       Past Medical History:   Diagnosis Date    Anemia     Hypertension        Past Surgical History:   Procedure Laterality Date    NECK SURGERY  12/12/2018        reports that he quit smoking about 34 years ago. His smoking use included cigarettes. He started smoking about 36 years ago. He has a 2 pack-year smoking history. He has been exposed to tobacco smoke. He quit smokeless tobacco use about 13 years ago.  His smokeless tobacco use included snuff. He reports that he does not drink alcohol and does not use drugs.    Review of Systems  Review of Systems   Constitutional:  Negative for chills, fatigue and fever.   HENT:  Negative for sore throat.    Respiratory:  Negative for cough, chest tightness, shortness of breath and wheezing.    Cardiovascular:  Negative for chest pain.   Gastrointestinal:  Positive for abdominal pain. Negative for abdominal distention, diarrhea, nausea and vomiting.   Genitourinary:  Negative for decreased urine volume, dysuria, flank pain, frequency, hematuria and urgency.   Musculoskeletal:  Negative for back pain.   Skin:  Negative for rash.   Neurological:  Negative for dizziness and weakness.   Psychiatric/Behavioral:  Negative  "for confusion.                                   Objective    Vitals:    11/27/24 1952   BP: 150/87   BP Location: Left arm   Patient Position: Sitting   BP Cuff Size: Adult   Pulse: 71   Resp: 16   Temp: 36.7 °C (98.1 °F)   TempSrc: Oral   SpO2: 94%   Weight: 81.6 kg (180 lb)   Height: 1.778 m (5' 10\")     No LMP for male patient.    Physical Exam  Vitals reviewed.   Constitutional:       General: He is awake.      Appearance: Normal appearance. He is well-developed and well-groomed. He is not ill-appearing, toxic-appearing or diaphoretic.   HENT:      Head: Normocephalic and atraumatic.   Cardiovascular:      Rate and Rhythm: Normal rate and regular rhythm.      Heart sounds: Normal heart sounds, S1 normal and S2 normal. Heart sounds not distant. No murmur heard.     No friction rub. No gallop.   Pulmonary:      Effort: Pulmonary effort is normal. No accessory muscle usage, prolonged expiration or respiratory distress.      Breath sounds: Normal breath sounds and air entry. No stridor or decreased air movement. No decreased breath sounds, wheezing, rhonchi or rales.   Abdominal:      General: Abdomen is flat. There is no distension.      Palpations: Abdomen is soft.      Tenderness: There is abdominal tenderness. There is no right CVA tenderness or left CVA tenderness.      Comments: Abdominal pain left lower quadrant worse to palpation no guarding or rigidity.  Abdominal pain also palpable midline lower.  No right-sided lower. no right-sided upper.   Skin:     General: Skin is warm.   Neurological:      General: No focal deficit present.      Mental Status: He is alert and oriented to person, place, and time.   Psychiatric:         Behavior: Behavior is cooperative.         Procedures    Point of Care Test & Imaging Results from this visit  No results found for this visit on 11/27/24.   No results found.    Diagnostic study results (if any) were reviewed by [facility].    Assessment/Plan   Allergies, " medications, history, and pertinent labs/EKGs/Imaging reviewed by Adrian Cardona PA-C.     Medical Decision Making  Patient is a 54-year-old male presenting to the clinic with abdominal pain.  Abdominal pain is over the midline lower and left side.  Abdominal pain worse to palpation.  Patient denies any nausea, vomiting, fever, chills, dysuria, urgency, frequency, back pain.  Patient states only abdominal pain worse to palpation currently 5 out of 10 in severity.  Vital signs in the clinic are stable.  Physical examination as above.  Patient states 7 out of 10 abdominal tenderness to palpation over the left lower quadrant and mid lower abdomen.  No significant guarding.  No rigidity.  No CVA tenderness.  Patient's urinalysis in the clinic is abnormal however, patient took Azo about an hour ago.  Patient initially states he thought he had a UTI.  Patient again denies any dysuria, frequency, urgency.  Patient only with abdominal pain to palpation.  Given patient's urinalysis cannot be trusted secondary to Azo it hard to be able to diagnose patient as UTI given his symptoms are not consistent.  Consulted attending physician.  Attending physician's recommendation is for emergency room transfer for further evaluation. [facility] given sign out on patient case.    Orders and Diagnoses  Diagnoses and all orders for this visit:  Lower abdominal pain  -     POCT UA Automated manually resulted      Medical Admin Record      Patient disposition: ED    Electronically signed by [facility]  7:58 PM

## 2024-11-28 NOTE — ED PROVIDER NOTES
HPI   Chief Complaint   Patient presents with    Abdominal Pain       54-year-old male presents to the emergency department pain.  Also dysuria.  Patient states that he has had urinary tract infections and kidney stones and also has a history of diverticulitis.  Denies any fevers or chills.  Is any diarrhea.  No vomiting.  The pain started suddenly around 5:30 in the afternoon.  Declined pain medication              Patient History   Past Medical History:   Diagnosis Date    Anemia     Hypertension      Past Surgical History:   Procedure Laterality Date    NECK SURGERY  2018     Family History   Problem Relation Name Age of Onset    Diabetes Mother      Heart failure Father      Diabetes Father      No Known Problems Sister      No Known Problems Brother      No Known Problems Daughter      No Known Problems Son      ADD / ADHD Son      Diabetes Sister      Diabetes Sister      Diabetes Brother      Diabetes Brother       Social History     Tobacco Use    Smoking status: Former     Current packs/day: 0.00     Average packs/day: 1 pack/day for 2.0 years (2.0 ttl pk-yrs)     Types: Cigarettes     Start date: 1988     Quit date: 1990     Years since quittin.0     Passive exposure: Past    Smokeless tobacco: Former     Types: Snuff     Quit date: 2010   Vaping Use    Vaping status: Never Used   Substance Use Topics    Alcohol use: Never    Drug use: Never       Physical Exam   ED Triage Vitals [24]   Temperature Heart Rate Respirations BP   36.4 °C (97.6 °F) 78 18 155/87      Pulse Ox Temp src Heart Rate Source Patient Position   98 % -- -- --      BP Location FiO2 (%)     -- --       Physical Exam  HENT:      Head: Normocephalic and atraumatic.   Cardiovascular:      Rate and Rhythm: Normal rate and regular rhythm.   Pulmonary:      Effort: Pulmonary effort is normal.      Breath sounds: Normal breath sounds.   Abdominal:      Palpations: Abdomen is soft.      Tenderness: There is  abdominal tenderness in the left lower quadrant.      Comments: The bladder appears full   Skin:     General: Skin is warm and dry.   Neurological:      General: No focal deficit present.      Mental Status: He is alert and oriented to person, place, and time.   Psychiatric:         Behavior: Behavior normal.           ED Course & MDM   Diagnoses as of 11/28/24 0353   Diverticulitis   Acute cystitis without hematuria   Hypokalemia   Enlarged prostate                 No data recorded     Mckinley Coma Scale Score: 15 (11/27/24 2047 : Saima Holguin RN)                 Labs Reviewed   CBC WITH AUTO DIFFERENTIAL - Abnormal       Result Value    WBC 9.0      nRBC 0.0      RBC 4.99      Hemoglobin 13.2 (*)     Hematocrit 39.9 (*)     MCV 80      MCH 26.5      MCHC 33.1      RDW 13.9      Platelets 234      Neutrophils % 58.2      Immature Granulocytes %, Automated 0.2      Lymphocytes % 29.3      Monocytes % 8.7      Eosinophils % 3.0      Basophils % 0.6      Neutrophils Absolute 5.23      Immature Granulocytes Absolute, Automated 0.02      Lymphocytes Absolute 2.63      Monocytes Absolute 0.78      Eosinophils Absolute 0.27      Basophils Absolute 0.05     COMPREHENSIVE METABOLIC PANEL - Abnormal    Glucose 83      Sodium 141      Potassium 3.1 (*)     Chloride 105      Bicarbonate 26      Anion Gap 13      Urea Nitrogen 13      Creatinine 0.81      eGFR >90      Calcium 8.9      Albumin 4.3      Alkaline Phosphatase 82      Total Protein 6.8      AST 14      Bilirubin, Total 0.3      ALT 14     URINALYSIS WITH REFLEX CULTURE AND MICROSCOPIC - Abnormal    Color, Urine Dark-Yellow      Appearance, Urine Clear      Specific Gravity, Urine 1.007      pH, Urine 6.5      Protein, Urine NEGATIVE      Glucose, Urine Normal      Blood, Urine NEGATIVE      Ketones, Urine NEGATIVE      Bilirubin, Urine 0.5 (1+) (*)     Urobilinogen, Urine 2 (1+) (*)     Nitrite, Urine 1+ (*)     Leukocyte Esterase, Urine NEGATIVE      MICROSCOPIC ONLY, URINE - Normal    WBC, Urine NONE      RBC, Urine 1-2     URINE CULTURE   URINALYSIS WITH REFLEX CULTURE AND MICROSCOPIC    Narrative:     The following orders were created for panel order Urinalysis with Reflex Culture and Microscopic.  Procedure                               Abnormality         Status                     ---------                               -----------         ------                     Urinalysis with Reflex C...[[id number]]  Abnormal            Final result               Extra Urine Gray Tube[[id number]]                            In process                   Please view results for these tests on the individual orders.   EXTRA URINE GRAY TUBE     CT abdomen pelvis wo IV contrast   Final Result   1.Acute diverticulitis lower descending colon.   2.Enlarged prostate. Correlate with PSA.   Signed by Jose Shannon,                   Medical Decision Making  54-year-old male who presents emergency department with left lower abdominal pain and dysuria and frequency.  Differential includes diverticulitis, pyelonephritis, ureterolithiasis, mesenteric adenitis, kidney stone, other causes not excluded.  Blood work which I reviewed and independently interpreted reveals normal white count, normal electrolytes except for mild hypokalemia of 3.1, with potassium replenished, urine reveals nitrites and few red cells.  CT scan of the abdomen and pelvis reveals enlarged prostate and diverticulitis.  The patient was given a dose of IV Levaquin but then on reconsideration he was discharged on Augmentin which was felt to be a better choice.  He is not septic or toxic.  He is to follow-up with primary care.  Radiology recommends PSA.  This has been discussed with the patient due to enlarged prostate.    Procedure  Procedures     Trey Ahn MD  11/28/24 0055       Trey Ahn MD  11/28/24 0353

## 2024-11-29 ENCOUNTER — TELEPHONE (OUTPATIENT)
Dept: PRIMARY CARE | Facility: CLINIC | Age: 54
End: 2024-11-29
Payer: COMMERCIAL

## 2024-11-29 LAB — BACTERIA UR CULT: NO GROWTH

## 2024-11-29 NOTE — TELEPHONE ENCOUNTER
Patient contacted answering service on 11/27/2024 at approximately 7:30 PM reporting concerns for possible UTI.  Stated that her lower abdominal pain has started earlier that afternoon.  Advocated that patient seek out in person evaluation at urgent care at which time urinalysis could appropriately be completed.  Referenced  [facility].  Patient understood and agreed with plans for consultation

## 2024-12-09 RX ORDER — OLMESARTAN MEDOXOMIL AND HYDROCHLOROTHIAZIDE 40/25 40; 25 MG/1; MG/1
1 TABLET ORAL DAILY
COMMUNITY
End: 2024-12-10 | Stop reason: WASHOUT

## 2024-12-10 ENCOUNTER — APPOINTMENT (OUTPATIENT)
Dept: PRIMARY CARE | Facility: CLINIC | Age: 54
End: 2024-12-10
Payer: COMMERCIAL

## 2024-12-10 VITALS
TEMPERATURE: 98.6 F | WEIGHT: 188 LBS | DIASTOLIC BLOOD PRESSURE: 80 MMHG | HEIGHT: 70 IN | OXYGEN SATURATION: 97 % | BODY MASS INDEX: 26.92 KG/M2 | HEART RATE: 72 BPM | SYSTOLIC BLOOD PRESSURE: 122 MMHG

## 2024-12-10 DIAGNOSIS — M54.9 DORSALGIA: ICD-10-CM

## 2024-12-10 DIAGNOSIS — I10 PRIMARY HYPERTENSION: Primary | ICD-10-CM

## 2024-12-10 DIAGNOSIS — R79.89 ELEVATED LFTS: ICD-10-CM

## 2024-12-10 DIAGNOSIS — Z12.5 SCREENING FOR PROSTATE CANCER: ICD-10-CM

## 2024-12-10 DIAGNOSIS — N52.01 ERECTILE DYSFUNCTION DUE TO ARTERIAL INSUFFICIENCY: ICD-10-CM

## 2024-12-10 DIAGNOSIS — D50.8 IRON DEFICIENCY ANEMIA SECONDARY TO INADEQUATE DIETARY IRON INTAKE: ICD-10-CM

## 2024-12-10 DIAGNOSIS — E87.6 HYPOKALEMIA: ICD-10-CM

## 2024-12-10 PROCEDURE — 3008F BODY MASS INDEX DOCD: CPT | Performed by: FAMILY MEDICINE

## 2024-12-10 PROCEDURE — 1036F TOBACCO NON-USER: CPT | Performed by: FAMILY MEDICINE

## 2024-12-10 PROCEDURE — 3079F DIAST BP 80-89 MM HG: CPT | Performed by: FAMILY MEDICINE

## 2024-12-10 PROCEDURE — 3074F SYST BP LT 130 MM HG: CPT | Performed by: FAMILY MEDICINE

## 2024-12-10 PROCEDURE — 99214 OFFICE O/P EST MOD 30 MIN: CPT | Performed by: FAMILY MEDICINE

## 2024-12-10 RX ORDER — TADALAFIL 20 MG/1
20 TABLET ORAL DAILY PRN
Qty: 30 TABLET | Refills: 5 | Status: SHIPPED | OUTPATIENT
Start: 2024-12-10

## 2024-12-10 ASSESSMENT — PATIENT HEALTH QUESTIONNAIRE - PHQ9
1. LITTLE INTEREST OR PLEASURE IN DOING THINGS: NOT AT ALL
2. FEELING DOWN, DEPRESSED OR HOPELESS: NOT AT ALL
SUM OF ALL RESPONSES TO PHQ9 QUESTIONS 1 AND 2: 0

## 2024-12-10 ASSESSMENT — PAIN SCALES - GENERAL: PAINLEVEL_OUTOF10: 2

## 2024-12-10 ASSESSMENT — ENCOUNTER SYMPTOMS
ENDOCRINE NEGATIVE: 1
DIARRHEA: 0
FEVER: 0
DIZZINESS: 0
DIFFICULTY URINATING: 0
SHORTNESS OF BREATH: 0
NAUSEA: 0

## 2024-12-10 NOTE — PROGRESS NOTES
"Subjective   Patient ID: Marlon Stoddard is a 54 y.o. male who presents for Follow-up (Elevated liver enzymes and hypertension) and Back Pain (Sees pain doctor-Dr Rand but not getting any results).    Patient with hypertension  History of iron deficiency anemia  History of elevated liver enzymes  History of erectile dysfunction  recent history of hypokalemia  Recent diverticulitis, feeling better  Back pain, neuropathic pain in left leg         Review of Systems   Constitutional:  Negative for fever.        Also see HPI   Eyes:  Negative for visual disturbance.   Respiratory:  Negative for shortness of breath.    Cardiovascular:  Negative for chest pain.   Gastrointestinal:  Negative for diarrhea and nausea.   Endocrine: Negative.    Genitourinary:  Negative for difficulty urinating.   Skin:  Negative for rash.   Neurological:  Negative for dizziness.        No focal deficits   Psychiatric/Behavioral:  Negative for suicidal ideas.    All other systems reviewed and are negative.      Objective   /80   Pulse 72   Temp 37 °C (98.6 °F)   Ht 1.778 m (5' 10\")   Wt 85.3 kg (188 lb)   SpO2 97%   BMI 26.98 kg/m²     Physical Exam  Vitals and nursing note reviewed.   Constitutional:       Appearance: Normal appearance.   HENT:      Head: Normocephalic and atraumatic.   Eyes:      Conjunctiva/sclera: Conjunctivae normal.   Neck:      Vascular: No carotid bruit.   Cardiovascular:      Rate and Rhythm: Normal rate and regular rhythm.      Heart sounds: Normal heart sounds.   Pulmonary:      Effort: Pulmonary effort is normal.      Breath sounds: Normal breath sounds.   Musculoskeletal:      Cervical back: Neck supple.      Right lower leg: No edema.      Left lower leg: No edema.   Neurological:      Mental Status: He is oriented to person, place, and time.   Psychiatric:         Mood and Affect: Mood normal.         Behavior: Behavior normal.         Assessment/Plan   Diagnoses and all orders for this visit:  Primary " hypertension  -     Uric Acid; Future  -     Albumin-Creatinine Ratio, Urine Random; Future  -     CBC and Auto Differential; Future  -     Comprehensive Metabolic Panel; Future  -     Lipid Panel; Future  -     TSH with reflex to Free T4 if abnormal; Future  Elevated LFTs  -     Comprehensive Metabolic Panel; Future  Erectile dysfunction due to arterial insufficiency  -     tadalafil 20 mg tablet; Take 1 tablet (20 mg) by mouth once daily as needed for erectile dysfunction.  Iron deficiency anemia secondary to inadequate dietary iron intake  -     CBC and Auto Differential; Future  -     Ferritin; Future  -     Iron and TIBC; Future  Hypokalemia  -     Comprehensive Metabolic Panel; Future  Screening for prostate cancer  -     Prostate Specific Antigen, Screen; Future

## 2024-12-26 ENCOUNTER — LAB (OUTPATIENT)
Dept: LAB | Facility: LAB | Age: 54
End: 2024-12-26
Payer: COMMERCIAL

## 2024-12-26 DIAGNOSIS — E87.6 HYPOKALEMIA: ICD-10-CM

## 2024-12-26 DIAGNOSIS — D50.8 IRON DEFICIENCY ANEMIA SECONDARY TO INADEQUATE DIETARY IRON INTAKE: ICD-10-CM

## 2024-12-26 DIAGNOSIS — I10 PRIMARY HYPERTENSION: ICD-10-CM

## 2024-12-26 DIAGNOSIS — R79.89 ELEVATED LFTS: ICD-10-CM

## 2024-12-26 DIAGNOSIS — Z12.5 SCREENING FOR PROSTATE CANCER: ICD-10-CM

## 2024-12-26 LAB
ALBUMIN SERPL BCP-MCNC: 3.8 G/DL (ref 3.4–5)
ALP SERPL-CCNC: 70 U/L (ref 33–120)
ALT SERPL W P-5'-P-CCNC: 11 U/L (ref 10–52)
ANION GAP SERPL CALC-SCNC: 10 MMOL/L (ref 10–20)
AST SERPL W P-5'-P-CCNC: 14 U/L (ref 9–39)
BASOPHILS # BLD AUTO: 0.02 X10*3/UL (ref 0–0.1)
BASOPHILS NFR BLD AUTO: 0.5 %
BILIRUB SERPL-MCNC: 0.4 MG/DL (ref 0–1.2)
BUN SERPL-MCNC: 13 MG/DL (ref 6–23)
CALCIUM SERPL-MCNC: 8.5 MG/DL (ref 8.6–10.3)
CHLORIDE SERPL-SCNC: 106 MMOL/L (ref 98–107)
CHOLEST SERPL-MCNC: 158 MG/DL (ref 0–199)
CHOLESTEROL/HDL RATIO: 4
CO2 SERPL-SCNC: 29 MMOL/L (ref 21–32)
CREAT SERPL-MCNC: 0.78 MG/DL (ref 0.5–1.3)
EGFRCR SERPLBLD CKD-EPI 2021: >90 ML/MIN/1.73M*2
EOSINOPHIL # BLD AUTO: 0.23 X10*3/UL (ref 0–0.7)
EOSINOPHIL NFR BLD AUTO: 5.4 %
ERYTHROCYTE [DISTWIDTH] IN BLOOD BY AUTOMATED COUNT: 14.3 % (ref 11.5–14.5)
FERRITIN SERPL-MCNC: 113 NG/ML (ref 20–300)
GLUCOSE SERPL-MCNC: 92 MG/DL (ref 74–99)
HCT VFR BLD AUTO: 38.4 % (ref 41–52)
HDLC SERPL-MCNC: 39.5 MG/DL
HGB BLD-MCNC: 12.3 G/DL (ref 13.5–17.5)
IMM GRANULOCYTES # BLD AUTO: 0.01 X10*3/UL (ref 0–0.7)
IMM GRANULOCYTES NFR BLD AUTO: 0.2 % (ref 0–0.9)
IRON SATN MFR SERPL: 34 % (ref 25–45)
IRON SERPL-MCNC: 101 UG/DL (ref 35–150)
LDLC SERPL CALC-MCNC: 101 MG/DL
LYMPHOCYTES # BLD AUTO: 1.29 X10*3/UL (ref 1.2–4.8)
LYMPHOCYTES NFR BLD AUTO: 30.4 %
MCH RBC QN AUTO: 26.6 PG (ref 26–34)
MCHC RBC AUTO-ENTMCNC: 32 G/DL (ref 32–36)
MCV RBC AUTO: 83 FL (ref 80–100)
MONOCYTES # BLD AUTO: 0.38 X10*3/UL (ref 0.1–1)
MONOCYTES NFR BLD AUTO: 8.9 %
NEUTROPHILS # BLD AUTO: 2.32 X10*3/UL (ref 1.2–7.7)
NEUTROPHILS NFR BLD AUTO: 54.6 %
NON HDL CHOLESTEROL: 119 MG/DL (ref 0–149)
NRBC BLD-RTO: 0 /100 WBCS (ref 0–0)
PLATELET # BLD AUTO: 222 X10*3/UL (ref 150–450)
POTASSIUM SERPL-SCNC: 3.4 MMOL/L (ref 3.5–5.3)
PROT SERPL-MCNC: 6 G/DL (ref 6.4–8.2)
RBC # BLD AUTO: 4.63 X10*6/UL (ref 4.5–5.9)
SODIUM SERPL-SCNC: 142 MMOL/L (ref 136–145)
T4 FREE SERPL-MCNC: 0.96 NG/DL (ref 0.61–1.12)
TIBC SERPL-MCNC: 299 UG/DL (ref 240–445)
TRIGL SERPL-MCNC: 86 MG/DL (ref 0–149)
TSH SERPL-ACNC: 0.39 MIU/L (ref 0.44–3.98)
UIBC SERPL-MCNC: 198 UG/DL (ref 110–370)
URATE SERPL-MCNC: 5.3 MG/DL (ref 4–7.5)
VLDL: 17 MG/DL (ref 0–40)
WBC # BLD AUTO: 4.3 X10*3/UL (ref 4.4–11.3)

## 2024-12-26 PROCEDURE — 82043 UR ALBUMIN QUANTITATIVE: CPT

## 2024-12-26 PROCEDURE — 84153 ASSAY OF PSA TOTAL: CPT

## 2024-12-26 PROCEDURE — 82570 ASSAY OF URINE CREATININE: CPT

## 2024-12-27 LAB
CREAT UR-MCNC: 191.6 MG/DL (ref 20–370)
MICROALBUMIN UR-MCNC: <7 MG/L
MICROALBUMIN/CREAT UR: NORMAL MG/G{CREAT}
PSA SERPL-MCNC: 1.14 NG/ML

## 2025-01-17 ENCOUNTER — OFFICE VISIT (OUTPATIENT)
Dept: ORTHOPEDIC SURGERY | Facility: CLINIC | Age: 55
End: 2025-01-17
Payer: COMMERCIAL

## 2025-01-17 DIAGNOSIS — M54.9 DORSALGIA: ICD-10-CM

## 2025-01-17 DIAGNOSIS — M48.061 DEGENERATIVE LUMBAR SPINAL STENOSIS: ICD-10-CM

## 2025-01-17 DIAGNOSIS — M79.672 FOOT ARCH PAIN, LEFT: Primary | ICD-10-CM

## 2025-01-17 PROCEDURE — 1036F TOBACCO NON-USER: CPT | Performed by: ORTHOPAEDIC SURGERY

## 2025-01-17 PROCEDURE — 99214 OFFICE O/P EST MOD 30 MIN: CPT | Performed by: ORTHOPAEDIC SURGERY

## 2025-01-17 PROCEDURE — 99204 OFFICE O/P NEW MOD 45 MIN: CPT | Performed by: ORTHOPAEDIC SURGERY

## 2025-01-17 ASSESSMENT — PAIN DESCRIPTION - DESCRIPTORS: DESCRIPTORS: SHOOTING;STABBING

## 2025-01-17 ASSESSMENT — ENCOUNTER SYMPTOMS
DEPRESSION: 0
OCCASIONAL FEELINGS OF UNSTEADINESS: 0
LOSS OF SENSATION IN FEET: 1

## 2025-01-17 ASSESSMENT — PAIN SCALES - GENERAL: PAINLEVEL_OUTOF10: 4

## 2025-01-17 ASSESSMENT — PAIN - FUNCTIONAL ASSESSMENT: PAIN_FUNCTIONAL_ASSESSMENT: 0-10

## 2025-01-17 NOTE — PROGRESS NOTES
54-year-old man with chronic pain in the arch of his left foot onset 2 and half years ago.  There is no specific injury.  He has had quite a sciatica of treatment attempts.  He has seen 3 different podiatrist.  He has had multiple injections into the foot.  He has had EMGs nerve conduction studies apparently.  He has had an MRI of the foot.  He has been in physical therapy at [facility] for the last year for this pain.  An MRI was performed showing degenerative changes in his lumbar spine.  He has been told that his back is the source of his arch pain.    He is completely comfortable sitting and lying down and driving.  The arch pain is always better when he first up in the morning.  He is on his feet frequently during the day, and his job as a hearing aid fitting specialist.  He has had multiple different orthotics without benefit.    He has experienced mild to moderate low back aching.  It is never strong enough to take medication for.  He has had no specific injuries.  He has had no right buttock leg or foot discomfort.  He has no numbness or tingling anywhere.  He has had no specific weakness in any part of the lower extremities or feet.  He gets no swelling.  On exam the patient is comfortable and moves naturally.  He is here with his wife.  He is tremor and has normal posture and gait.  He has no swelling in the calves or feet.  He has normal spinal posture and balance.  Natural normal gait.  Normal heel walk and toe walk.  He has brisk knee jerks and ankle jerks.  Good ankle flexibility without discomfort.  Normal midfoot flexibility.  He has focal sharp tenderness to deep palpation in the center of his left arch with no other tenderness.  No heel pain.  No metatarsalgia.  Skin condition is normal.  His right foot is entirely normal and nontender.  Hips and knees are unremarkable.  Manual strength resistance testing is symmetric in all muscle groups both lower extremities.  Skin condition is good capillary  refill normal pulses are strong sensation is normal throughout.  Straight leg raising is negative.  His back is nontender to palpation.  He is nontender with full flexion and full extension.  He can toe-touch to the floor.  I reviewed the images of the MRI of his lumbar spine on September 3, 2024.  L5-S1 is entirely normal.  He has Modic changes with slight desiccation at the L4-5 disc and slight bulging with right-sided recess stenosis related to a focal foraminal annular protrusion.  There is a broad bulge across L3-4 with mild impingement on the thecal sac but no significant stenosis.    CT of the abdomen was performed on November 17 which shows degenerative changes with a vacuum signal in the L4-5 disc space, and some narrowing in the right L4-5 recess only    Impression is of localized left arch pain in the foot only.  There is no suggestion of neurologic involvement.  There is no imaging to suggest neural compression on the left side.  The left arch is tender to direct palpation and is the same pain that he experiences standing and walking.    I encouraged him to pursue a fitness program to maintain his back and his general health.  I have suggested a second opinion for treatment of his focal left foot problems

## 2025-02-06 ENCOUNTER — OFFICE VISIT (OUTPATIENT)
Dept: ORTHOPEDIC SURGERY | Facility: CLINIC | Age: 55
End: 2025-02-06
Payer: COMMERCIAL

## 2025-02-06 ENCOUNTER — HOSPITAL ENCOUNTER (OUTPATIENT)
Dept: RADIOLOGY | Facility: CLINIC | Age: 55
Discharge: HOME | End: 2025-02-06
Payer: COMMERCIAL

## 2025-02-06 DIAGNOSIS — M62.469 GASTROCNEMIUS EQUINUS, UNSPECIFIED LATERALITY: ICD-10-CM

## 2025-02-06 DIAGNOSIS — M79.672 FOOT ARCH PAIN, LEFT: ICD-10-CM

## 2025-02-06 DIAGNOSIS — M72.2 PLANTAR FASCIITIS: Primary | ICD-10-CM

## 2025-02-06 PROCEDURE — 73630 X-RAY EXAM OF FOOT: CPT | Mod: LT

## 2025-02-06 PROCEDURE — 99214 OFFICE O/P EST MOD 30 MIN: CPT | Performed by: ORTHOPAEDIC SURGERY

## 2025-02-06 RX ORDER — CEFAZOLIN SODIUM 2 G/100ML
2 INJECTION, SOLUTION INTRAVENOUS ONCE
OUTPATIENT
Start: 2025-02-06 | End: 2025-02-06

## 2025-02-06 NOTE — PROGRESS NOTES
54-year-old male here for chronic left foot pain.  Has had issues with pain in his left heel and arch over the past 3 years.  Has been followed by podiatry.  He has had multiple injections which gave some relief with lidocaine.  Nothing long-term.  Has been through a course of physical therapy.  Has had custom orthotics.  Has persistent and severe pain on along the plantar medial aspect of his arch.  Pain is affecting daily activity.  Would like to consider correction.  Remote history of bunion correction and second toe surgery as a child.    On exam:  WD/WN  A+O X3  NAD  No lymphedema  Inspection of both feet and ankles show symmetric arches.   Point tender along proximal left plantar fascia.  No nodule.  Able to STR bilaterally.   5/5 strength in all 4 planes.   Sensation intact to LT.   Good pulses.   Stable anterior drawer.  No peroneal subluxation.    (+) Bilateral Silverskold.     I personally reviewed the following radiographic exams: MRI of the forefoot shows some proximal thickening of the plantar fascia but does not extend to the origin at the calcaneus.  No nodules noted.  Mild degenerative change of first and second MTP joint.  Probable postsurgical.    Assessment: Tight gastroc with chronic plantar fasciitis left heel.    Plan: Discussed nonoperative and operative options in detail.   Risk and benefits discussed in detail. All questions answered today.  Recovery timeline and expectations discussed in detail.  Discussed possibly obtaining MRI to show the more proximal plantar fascia though I think this is unlikely to change treatment.  Would offer gastroc recession and plantar fascial release.  Discussed postop recovery.  Will consider setting up in the near future.  Left plantar fascia release [id number], left gastroc recession [id number].  General and regional.  Outpatient.  30 minutes.  Supine.  Crutches.

## 2025-02-14 DIAGNOSIS — M72.2 PLANTAR FASCIITIS: ICD-10-CM

## 2025-03-14 ENCOUNTER — TELEPHONE (OUTPATIENT)
Dept: PRIMARY CARE | Facility: CLINIC | Age: 55
End: 2025-03-14
Payer: COMMERCIAL

## 2025-03-14 DIAGNOSIS — K57.92 DIVERTICULITIS: Primary | ICD-10-CM

## 2025-03-14 RX ORDER — CIPROFLOXACIN 500 MG/1
500 TABLET ORAL 2 TIMES DAILY
Qty: 14 TABLET | Refills: 0 | Status: SHIPPED | OUTPATIENT
Start: 2025-03-14 | End: 2025-03-21

## 2025-03-14 RX ORDER — METRONIDAZOLE 500 MG/1
500 TABLET ORAL 3 TIMES DAILY
Qty: 21 TABLET | Refills: 0 | Status: SHIPPED | OUTPATIENT
Start: 2025-03-14 | End: 2025-03-21

## 2025-03-14 NOTE — TELEPHONE ENCOUNTER
Patient states his diverticulitis is flared up for the pass 6 days he was hoping it would go away on it own, requesting ATB last ov 12/10/24 no upcoming appt scheduled please advise

## 2025-03-20 ENCOUNTER — CLINICAL SUPPORT (OUTPATIENT)
Dept: PREADMISSION TESTING | Facility: HOSPITAL | Age: 55
End: 2025-03-20
Payer: COMMERCIAL

## 2025-03-20 DIAGNOSIS — M72.2 PLANTAR FASCIITIS: ICD-10-CM

## 2025-03-20 RX ORDER — BISMUTH SUBSALICYLATE 262 MG
1 TABLET,CHEWABLE ORAL DAILY
COMMUNITY

## 2025-03-20 RX ORDER — IBUPROFEN 200 MG
400 TABLET ORAL EVERY 6 HOURS PRN
COMMUNITY

## 2025-03-20 RX ORDER — DEXTROMETHORPHAN HYDROBROMIDE, GUAIFENESIN 5; 100 MG/5ML; MG/5ML
650 LIQUID ORAL EVERY 8 HOURS PRN
COMMUNITY

## 2025-03-20 RX ORDER — MELATONIN 5 MG
1 CAPSULE ORAL AS NEEDED
COMMUNITY

## 2025-03-20 RX ORDER — DIPHENHYDRAMINE HCL 25 MG
2 CAPSULE ORAL AS NEEDED
COMMUNITY

## 2025-03-20 NOTE — CPM/PAT NURSE NOTE
CPM/PAT Nurse Note      Name: Marlon Stoddard (Marlon Stoddard)  /Age: 1970/54 y.o.       Past Medical History:   Diagnosis Date    Adverse effect of anesthesia     urinary retention    Anemia     daily iron, saw hematology Dr. Fox 24; H/H= 12.3/38.4 on 24; Free thyroxine, ferritin and Iron&TIBC all WNL on 24    Anxiety     no meds, uses breathing technique    Chronic foot pain, left     Diverticulitis     Diverticulitis     currently having a flare up-improving with ATB's    ED (erectile dysfunction)     Gastrocnemius equinus of left lower extremity     Hypertension     on no meds    Hypokalemia     K= 3.6 on 24    Plantar fasciitis     PONV (postoperative nausea and vomiting)     Vitamin D deficiency        Past Surgical History:   Procedure Laterality Date    BUNIONECTOMY Bilateral     COLONOSCOPY      NECK SURGERY  2018    TONSILLECTOMY      VASECTOMY         Patient Sexual activity questions deferred to the physician.    Family History   Problem Relation Name Age of Onset    Diabetes Mother      Heart failure Father      Diabetes Father      No Known Problems Sister      Diabetes Sister      Diabetes Sister      No Known Problems Brother      Diabetes Brother      Diabetes Brother      No Known Problems Daughter      No Known Problems Son      ADD / ADHD Son         No Known Allergies    Prior to Admission medications    Medication Sig Start Date End Date Taking? Authorizing Provider   acetaminophen (Tylenol 8 HOUR) 650 mg ER tablet Take 1 tablet (650 mg) by mouth every 8 hours if needed for mild pain (1 - 3). Do not crush, chew, or split.    Historical Provider, MD   ciprofloxacin (Cipro) 500 mg tablet Take 1 tablet (500 mg) by mouth 2 times a day for 7 days. 3/14/25 3/21/25  Jignesh Way, DO   ferrous sulfate, 325 mg ferrous sulfate, tablet Take 1 tablet (325 mg) by mouth once daily with breakfast.  Patient not taking: Reported on 3/20/2025    Historical Provider,  MD   ibuprofen 200 mg tablet Take 2 tablets (400 mg) by mouth every 6 hours if needed for mild pain (1 - 3).    Historical Provider, MD   melatonin 5 mg capsule Take 1 capsule (5 mg) by mouth if needed.    Historical Provider, MD   metroNIDAZOLE (Flagyl) 500 mg tablet Take 1 tablet (500 mg) by mouth 3 times a day for 7 days. 3/14/25 3/21/25  Jignesh Way DO   multivitamin tablet Take 1 tablet by mouth once daily.    Historical Provider, MD   nicotine polacrilex (Nicorette) 4 mg gum Chew 1 each (4 mg) if needed for smoking cessation.    Historical Provider, MD   olopatadine HCl (EYE ALLERGY ITCH-REDNESS RLF OPHT) Administer into affected eye(s) if needed.    Historical Provider, MD   OREGANO OIL ORAL Take by mouth once daily.    Historical Provider, MD   SAW PALMETTO ORAL Take 1 capsule by mouth once daily.    Historical Provider, MD   tadalafil 20 mg tablet Take 1 tablet (20 mg) by mouth once daily as needed for erectile dysfunction. 12/10/24   Jignesh Way DO   tamsulosin (Flomax) 0.4 mg 24 hr capsule Take 1 capsule (0.4 mg) by mouth once daily at bedtime.    Historical Provider, MD ERIN TAVAREZ     DASI Risk Score    No data to display       Caprini DVT Assessment    No data to display       Modified Frailty Index    No data to display       [id number] Stroke Risk Points  Current as of just now        N/A 0 to 9 Points:      Last Change: N/A          The [id number] risk score (Lip GH, et al. 2009. © 2010 [employer]) quantifies the risk of stroke for a patient with atrial fibrillation. For patients without atrial fibrillation or under the age of 18 this score appears as N/A. Higher score values generally indicate higher risk of stroke.        This score is not applicable to this patient. Components are not calculated.          Revised Cardiac Risk Index    No data to display       Apfel Simplified Score    No data to display       Risk Analysis Index Results This Encounter    No  data found in the last 10 encounters.       Prodigy: High Risk  Total Score: 8              Prodigy Gender Score          ARISCAT Score for Postoperative Pulmonary Complications    No data to display       Scot Perioperative Risk for Myocardial Infarction or Cardiac Arrest (JAYY)    No data to display         Nurse Plan of Action:       RN screening call complete.  Reviewed allergies, medications and pharmacy, medical, surgical and social history with patient.  Chart updated.

## 2025-03-28 ENCOUNTER — DOCUMENTATION (OUTPATIENT)
Dept: PREADMISSION TESTING | Facility: HOSPITAL | Age: 55
End: 2025-03-28

## 2025-03-28 ENCOUNTER — PRE-ADMISSION TESTING (OUTPATIENT)
Dept: PREADMISSION TESTING | Facility: HOSPITAL | Age: 55
End: 2025-03-28
Payer: COMMERCIAL

## 2025-03-28 ENCOUNTER — LAB (OUTPATIENT)
Dept: LAB | Facility: HOSPITAL | Age: 55
End: 2025-03-28
Payer: COMMERCIAL

## 2025-03-28 VITALS
HEIGHT: 69 IN | TEMPERATURE: 98.7 F | OXYGEN SATURATION: 98 % | BODY MASS INDEX: 25.14 KG/M2 | HEART RATE: 72 BPM | RESPIRATION RATE: 18 BRPM | SYSTOLIC BLOOD PRESSURE: 130 MMHG | DIASTOLIC BLOOD PRESSURE: 88 MMHG | WEIGHT: 169.75 LBS

## 2025-03-28 DIAGNOSIS — Z01.818 PREOP TESTING: Primary | ICD-10-CM

## 2025-03-28 DIAGNOSIS — Z01.818 ENCOUNTER FOR OTHER PREPROCEDURAL EXAMINATION: Primary | ICD-10-CM

## 2025-03-28 LAB
ALBUMIN SERPL BCP-MCNC: 4.2 G/DL (ref 3.4–5)
ALP SERPL-CCNC: 65 U/L (ref 33–120)
ALT SERPL W P-5'-P-CCNC: 19 U/L (ref 10–52)
ANION GAP SERPL CALC-SCNC: 12 MMOL/L (ref 10–20)
AST SERPL W P-5'-P-CCNC: 19 U/L (ref 9–39)
BASOPHILS # BLD AUTO: 0.06 X10*3/UL (ref 0–0.1)
BASOPHILS NFR BLD AUTO: 0.9 %
BILIRUB SERPL-MCNC: 0.4 MG/DL (ref 0–1.2)
BUN SERPL-MCNC: 13 MG/DL (ref 6–23)
CALCIUM SERPL-MCNC: 8.7 MG/DL (ref 8.6–10.3)
CHLORIDE SERPL-SCNC: 104 MMOL/L (ref 98–107)
CO2 SERPL-SCNC: 30 MMOL/L (ref 21–32)
CREAT SERPL-MCNC: 0.82 MG/DL (ref 0.5–1.3)
EGFRCR SERPLBLD CKD-EPI 2021: >90 ML/MIN/1.73M*2
EOSINOPHIL # BLD AUTO: 0.19 X10*3/UL (ref 0–0.7)
EOSINOPHIL NFR BLD AUTO: 2.9 %
ERYTHROCYTE [DISTWIDTH] IN BLOOD BY AUTOMATED COUNT: 14.2 % (ref 11.5–14.5)
GLUCOSE SERPL-MCNC: 108 MG/DL (ref 74–99)
HCT VFR BLD AUTO: 41.1 % (ref 41–52)
HGB BLD-MCNC: 13.3 G/DL (ref 13.5–17.5)
IMM GRANULOCYTES # BLD AUTO: 0.02 X10*3/UL (ref 0–0.7)
IMM GRANULOCYTES NFR BLD AUTO: 0.3 % (ref 0–0.9)
LYMPHOCYTES # BLD AUTO: 1.9 X10*3/UL (ref 1.2–4.8)
LYMPHOCYTES NFR BLD AUTO: 29.2 %
MCH RBC QN AUTO: 25.9 PG (ref 26–34)
MCHC RBC AUTO-ENTMCNC: 32.4 G/DL (ref 32–36)
MCV RBC AUTO: 80 FL (ref 80–100)
MONOCYTES # BLD AUTO: 0.54 X10*3/UL (ref 0.1–1)
MONOCYTES NFR BLD AUTO: 8.3 %
NEUTROPHILS # BLD AUTO: 3.79 X10*3/UL (ref 1.2–7.7)
NEUTROPHILS NFR BLD AUTO: 58.4 %
NRBC BLD-RTO: 0 /100 WBCS (ref 0–0)
PLATELET # BLD AUTO: 263 X10*3/UL (ref 150–450)
POTASSIUM SERPL-SCNC: 4.2 MMOL/L (ref 3.5–5.3)
PROT SERPL-MCNC: 6.5 G/DL (ref 6.4–8.2)
RBC # BLD AUTO: 5.13 X10*6/UL (ref 4.5–5.9)
SODIUM SERPL-SCNC: 142 MMOL/L (ref 136–145)
WBC # BLD AUTO: 6.5 X10*3/UL (ref 4.4–11.3)

## 2025-03-28 PROCEDURE — 85025 COMPLETE CBC W/AUTO DIFF WBC: CPT

## 2025-03-28 PROCEDURE — 99204 OFFICE O/P NEW MOD 45 MIN: CPT | Performed by: PHYSICIAN ASSISTANT

## 2025-03-28 PROCEDURE — 80053 COMPREHEN METABOLIC PANEL: CPT

## 2025-03-28 ASSESSMENT — ENCOUNTER SYMPTOMS
MUSCULOSKELETAL NEGATIVE: 1
RESPIRATORY NEGATIVE: 1
ALLERGIC/IMMUNOLOGIC NEGATIVE: 1
CONSTITUTIONAL NEGATIVE: 1
GASTROINTESTINAL NEGATIVE: 1
ENDOCRINE NEGATIVE: 1
EYES NEGATIVE: 1
HEMATOLOGIC/LYMPHATIC NEGATIVE: 1
NEUROLOGICAL NEGATIVE: 1
PSYCHIATRIC NEGATIVE: 1
CARDIOVASCULAR NEGATIVE: 1

## 2025-03-28 NOTE — H&P (VIEW-ONLY)
[facility] Evaluation       Name: Marlon Stoddard (Marlon Stoddard)  /Age: 1970/54 y.o.     In-Person       Chief Complaint: Gastrocnemius equinus, unspecified laterality [M62.469]  Plantar fasciitis [M72.2]     HPI      Date of Consult: 3/28/25    Referring Provider: Dr. Ivan     Surgery, Date, and Length: Left Foot Plantar Fasciectomy - Left  Left Ankle Muscle Gastrocnemius Recession - Left; 25; 60 minutes     Marlon Stoddard  is a 54 year-old male who presents to the [facility] for perioperative risk assessment prior to surgery. Presented   for chronic left foot pain. Has had issues with pain in his left heel and arch over the past 3 years. Has been followed by podiatry. He has had multiple injections which gave some relief with lidocaine. Nothing long-term. Has been through a course of physical therapy. Has had custom orthotics. Has persistent and severe pain on along the plantar medial aspect of his arch. Pain is affecting daily activity. Would like to consider correction. Remote history of bunion correction and second toe surgery as a child.       This note was created in part upon personal review of patient's medical records.      Patient is scheduled to have Left Foot Plantar Fasciectomy - Left  Left Ankle Muscle Gastrocnemius Recession - Left     Medical History  Past Medical History:   Diagnosis Date    Adverse effect of anesthesia     urinary retention    Anemia     daily iron, saw hematology Dr. Fox 24; H/H= 12.3/38.4 on 24; Free thyroxine, ferritin and Iron&TIBC all WNL on 24    Anxiety     no meds, uses breathing technique    BPH (benign prostatic hyperplasia)     Chronic foot pain, left     Plan: Left Foot Plantar Fasciectomy - Left Left Ankle Muscle Gastrocnemius Recession - Left; 25;    Diverticulosis     last acute few weeks back - has finished antibiotics and has upcomng colonoscopy    ED (erectile dysfunction)     Elevated LFTs     under observation     Gastrocnemius equinus of left lower extremity     Plan: Left Foot Plantar Fasciectomy - Left Left Ankle Muscle Gastrocnemius Recession - Left; 25;    GERD (gastroesophageal reflux disease)     minor    Hypertension     off meds ~8 months - previously on as preventative for family hx    Hypokalemia     Nephrolithiasis     Plantar fasciitis     PONV (postoperative nausea and vomiting)     Vitamin D deficiency             Surgical History  Past Surgical History:   Procedure Laterality Date    BUNIONECTOMY Bilateral     CERVICAL DISCECTOMY  2018    C6    COLONOSCOPY  2020    TONSILLECTOMY      VASECTOMY               Family history:  Family History   Problem Relation Name Age of Onset    Diabetes Mother      Heart failure Father      Diabetes Father      No Known Problems Sister      Diabetes Sister      Diabetes Sister      No Known Problems Brother      Diabetes Brother      Diabetes Brother      No Known Problems Daughter      No Known Problems Son      ADD / ADHD Son          Social history:  Social History     Socioeconomic History    Marital status:      Spouse name: zaki    Number of children: 3    Years of education: Not on file    Highest education level: Not on file   Occupational History    Occupation: employed   Tobacco Use    Smoking status: Former     Current packs/day: 0.00     Average packs/day: 1 pack/day for 2.0 years (2.0 ttl pk-yrs)     Types: Cigarettes     Start date: 1988     Quit date: 1990     Years since quittin.3     Passive exposure: Past    Smokeless tobacco: Former     Types: Snuff     Quit date: 2010    Tobacco comments:     Nicotine gum   Vaping Use    Vaping status: Never Used   Substance and Sexual Activity    Alcohol use: Yes     Comment: 1-2 drinks once a year    Drug use: Yes     Types: Marijuana     Comment: in his 20--30's    Sexual activity: Defer   Other Topics Concern    Not on file   Social History Narrative    Not on file     Social Drivers of  "Health     Financial Resource Strain: Not on file   Food Insecurity: Not on file   Transportation Needs: Not on file   Physical Activity: Not on file   Stress: Not on file   Social Connections: Not on file   Intimate Partner Violence: Not on file   Housing Stability: Not on file        Current Outpatient Medications   Medication Instructions    acetaminophen (TYLENOL 8 HOUR) 650 mg, oral, Every 8 hours PRN, Do not crush, chew, or split.    ferrous sulfate 325 mg, Daily with breakfast    ibuprofen 400 mg, Every 6 hours PRN    melatonin 5 mg capsule 1 capsule, As needed    multivitamin tablet 1 tablet, Daily    nicotine polacrilex (NICORETTE) 2 mg, As needed    OREGANO OIL ORAL Daily    SAW PALMETTO ORAL 1 capsule, Daily    tadalafil 20 mg, oral, Daily PRN    tamsulosin (FLOMAX) 0.4 mg, Nightly     Visit Vitals  /88   Pulse 72   Temp 37.1 °C (98.7 °F) (Temporal)   Resp 18   Ht 1.746 m (5' 8.75\")   Wt 77 kg (169 lb 12.1 oz)   SpO2 98%   BMI 25.25 kg/m²   Smoking Status Former   BSA 1.93 m²       Review of Systems   Constitutional: Negative.    HENT: Negative.     Eyes: Negative.    Respiratory: Negative.     Cardiovascular: Negative.         METS 4 +stairs / walk / daily activities Without chest pain / SOB - limited by foot pain only    Gastrointestinal: Negative.    Endocrine: Negative.    Genitourinary: Negative.    Musculoskeletal: Negative.         Left foot pain    Skin: Negative.    Allergic/Immunologic: Negative.    Neurological: Negative.    Hematological: Negative.    Psychiatric/Behavioral: Negative.          Physical Exam  Constitutional:       Appearance: Normal appearance.   HENT:      Head: Normocephalic and atraumatic.      Right Ear: External ear normal.      Left Ear: External ear normal.      Nose: Nose normal.      Mouth/Throat:      Pharynx: Oropharynx is clear.   Eyes:      Conjunctiva/sclera: Conjunctivae normal.   Cardiovascular:      Rate and Rhythm: Normal rate and regular rhythm.      " Heart sounds: Normal heart sounds.   Pulmonary:      Effort: Pulmonary effort is normal.      Breath sounds: Normal breath sounds.   Abdominal:      General: Abdomen is flat.      Palpations: Abdomen is soft.   Musculoskeletal:         General: Normal range of motion.      Cervical back: Normal range of motion and neck supple.   Skin:     General: Skin is warm and dry.   Neurological:      General: No focal deficit present.      Mental Status: He is alert and oriented to person, place, and time.   Psychiatric:         Mood and Affect: Mood normal.         Behavior: Behavior normal.         Thought Content: Thought content normal.         Judgment: Judgment normal.          PAT AIRWAY:   Airway:     Mallampati::  I    Neck ROM::  Full    LABS:  Lab Results   Component Value Date    WBC 6.5 03/28/2025    HGB 13.3 (L) 03/28/2025    HCT 41.1 03/28/2025    MCV 80 03/28/2025     03/28/2025      Lab Results   Component Value Date    GLUCOSE 108 (H) 03/28/2025    CALCIUM 8.7 03/28/2025     03/28/2025    K 4.2 03/28/2025    CO2 30 03/28/2025     03/28/2025    BUN 13 03/28/2025    CREATININE 0.82 03/28/2025      Lab Results   Component Value Date    ALT 19 03/28/2025    AST 19 03/28/2025    ALKPHOS 65 03/28/2025    BILITOT 0.4 03/28/2025        Patient is scheduled to have Left Foot Plantar Fasciectomy - Left  Left Ankle Muscle Gastrocnemius Recession - Left     Cardiovascular  METS: 4   RCRI: 0  , 3.9 % Risk of MACE  Caprini: 4    Pulmonary:  No pulmonary diagnosis or significant findings on chart review or clinical presentation.  No further preoperative testing is indicated at this time.    Stop Bang score is 2 placing patient at low risk for FANTASMA  ARISCAT: <26 points, 1.6% risk of in-hospital postoperative pulmonary complication  PRODIGY: Moderate risk for opioid induced respiratory depression     Neurology  No neurologic diagnosis or significant findings on chart review, clinical presentation and  evaluation.  No grossly apparent neurologic perioperative risk.    Patient is not at increased risk for perioperative CVA     Renal  BPH- tamsulosin Continue DOS     Hematology       Patient instructed to ambulate as soon as possible postoperatively to decrease thromboembolic risk.      Initiate mechanical DVT prophylaxis as soon as possible and initiate chemical prophylaxis when deemed safe from a bleeding standpoint post surgery.       VTE prophylaxis per surgical team         Tests ordered in PAT: cbc, bmp   Results reviewed and unremarkable      Risk assessment complete.  Patient is scheduled for a low surgical risk procedure.        Preoperative medication instructions were provided and reviewed with the patient.  Any additional testing or evaluation was explained to the patient.  Nothing by mouth instructions were discussed and patient's questions were answered prior to conclusion to this encounter.  Patient verbalized understanding of preoperative instructions given in preadmission testing; discharge instructions available in EMR.

## 2025-03-28 NOTE — PREPROCEDURE INSTRUCTIONS
Medication List            Accurate as of March 28, 2025  2:35 PM. Always use your most recent med list.                acetaminophen 650 mg ER tablet  Commonly known as: Tylenol 8 HOUR  Medication Adjustments for Surgery: Take/Use as prescribed     ferrous sulfate tablet  Medication Adjustments for Surgery: Do Not take on the morning of surgery     ibuprofen 200 mg tablet  Notes to patient: HOLD 7 Days prior to surgery - Last dose 3/30     melatonin 5 mg capsule  Medication Adjustments for Surgery: Take/Use as prescribed     multivitamin tablet  Notes to patient: HOLD 7 Days prior to surgery - Last dose 3/30     nicotine polacrilex 4 mg gum  Commonly known as: Nicorette  Medication Adjustments for Surgery: Do Not take on the morning of surgery     OREGANO OIL ORAL  Notes to patient: HOLD 7 Days prior to surgery - Last dose 3/30     SAW PALMETTO ORAL  Notes to patient: HOLD 7 Days prior to surgery - Last dose 3/30     tadalafil 20 mg tablet  Take 1 tablet (20 mg) by mouth once daily as needed for erectile dysfunction.  Notes to patient: HOLD 3 Days prior to surgery - Last dose 4/3     tamsulosin 0.4 mg 24 hr capsule  Commonly known as: Flomax  Medication Adjustments for Surgery: Take/Use as prescribed                 Concerning above medication instructions - If medication is normally taken at night continue normal schedule - do not take night prior and morning of surgery.       Preoperative Deep Breathing Exercises  Why it is important to do deep breathing exercises before my surgery?  Deep breathing exercises strengthen your breathing muscles.  This helps you to recover after your surgery and decreases the chance of breathing complications.  How are the deep breathing exercises done?  Sit straight with your back supported.  Breathe in deeply and slowly through your nose. Your lower rib cage should expand and your abdomen may move forward.  Hold that breath for 3 to 5 seconds.  Breathe out through pursed lips,  slowly and completely.  Rest and repeat 10 times every hour while awake.  Rest longer if you become dizzy or lightheaded.      CONTACT SURGEON'S OFFICE IF YOU DEVELOP:  * Fever = 100.4 F   * New respiratory symptoms (e.g. cough, shortness of breath, respiratory distress, sore throat)  * Recent loss of taste or smell  *Flu like symptoms such as headache, fatigue or gastrointestinal symptoms  * You develop any open sores, shingles, burning or painful urination   AND/OR:  * You no longer wish to have the surgery.  * Any other personal circumstances change that may lead to the need to cancel or defer this surgery.  *You were admitted to any hospital within one week of your planned procedure.    SMOKING:  *Quitting smoking can make a huge difference to your health and recovery from surgery.    *If you need help with quitting, call 9-715-QUIT-NOW.    THE DAY OF SURGERY:  *Do not eat any food after midnight the night before surgery/procedure.   *YOU MUST DRINK 14 oz drink clear liquids (i.e. water, black coffee/tea, (no milk or cream) apple juice, and electrolyte drinks (Gatorade)  2 hours before your instructed arrival time to the hospital.  *You may chew gum until  2 hours before your surgery/procedure.    SURGICAL TIME  *You will be contacted between 2 p.m. and 6 p.m. the business day before your surgery with your arrival time.  *If you haven't received a call by 6pm, call [phone].  *Scheduled surgery times may change and you will be notified if this occurs-check your personal voicemail for any updates.    ON THE MORNING OF SURGERY:  *Wear comfortable, loose fitting clothing.   *Do not use moisturizers, creams, lotions or perfume.  *All jewelry and valuables should be left at home.  *Prosthetic devices such as contact lenses, hearing aids, dentures, eyelash extensions, hairpins and body piercing must be removed before surgery.    BRING WITH YOU:  *Photo ID and insurance card  *Current list of medicines and  allergies  *Pacemaker/Defibrillator/Heart stent cards  *CPAP machine and mask  *Slings/splints/crutches  *Copy of your complete Advanced Directive/DHPOA-if applicable  *Neurostimulator implant remote    PARKING AND ARRIVAL:  *Check in at the Main Entrance desk and let them know you are here for surgery.  *You will be directed to the 2nd floor surgical waiting area.    AFTER OUTPATIENT SURGERY:  *A responsible adult MUST accompany you at the time of discharge and stay with you for 24 hours after your surgery.  *You may NOT drive yourself home after surgery.  *You may use a taxi or ride sharing service ([employer], [employer]) to return home ONLY if you are accompanied by a friend or family member.  *Instructions for resuming your medications will be provided by your surgeon.

## 2025-03-28 NOTE — CPM/PAT H&P
[facility] Evaluation       Name: Marlon Stoddard (Marlon Stoddard)  /Age: 1970/54 y.o.     In-Person       Chief Complaint: Gastrocnemius equinus, unspecified laterality [M62.469]  Plantar fasciitis [M72.2]     HPI      Date of Consult: 3/28/25    Referring Provider: Dr. Ivan     Surgery, Date, and Length: Left Foot Plantar Fasciectomy - Left  Left Ankle Muscle Gastrocnemius Recession - Left; 25; 60 minutes     Marlon Stoddard  is a 54 year-old male who presents to the [facility] for perioperative risk assessment prior to surgery. Presented   for chronic left foot pain. Has had issues with pain in his left heel and arch over the past 3 years. Has been followed by podiatry. He has had multiple injections which gave some relief with lidocaine. Nothing long-term. Has been through a course of physical therapy. Has had custom orthotics. Has persistent and severe pain on along the plantar medial aspect of his arch. Pain is affecting daily activity. Would like to consider correction. Remote history of bunion correction and second toe surgery as a child.       This note was created in part upon personal review of patient's medical records.      Patient is scheduled to have Left Foot Plantar Fasciectomy - Left  Left Ankle Muscle Gastrocnemius Recession - Left     Medical History  Past Medical History:   Diagnosis Date    Adverse effect of anesthesia     urinary retention    Anemia     daily iron, saw hematology Dr. Fox 24; H/H= 12.3/38.4 on 24; Free thyroxine, ferritin and Iron&TIBC all WNL on 24    Anxiety     no meds, uses breathing technique    BPH (benign prostatic hyperplasia)     Chronic foot pain, left     Plan: Left Foot Plantar Fasciectomy - Left Left Ankle Muscle Gastrocnemius Recession - Left; 25;    Diverticulosis     last acute few weeks back - has finished antibiotics and has upcomng colonoscopy    ED (erectile dysfunction)     Elevated LFTs     under observation     Gastrocnemius equinus of left lower extremity     Plan: Left Foot Plantar Fasciectomy - Left Left Ankle Muscle Gastrocnemius Recession - Left; 25;    GERD (gastroesophageal reflux disease)     minor    Hypertension     off meds ~8 months - previously on as preventative for family hx    Hypokalemia     Nephrolithiasis     Plantar fasciitis     PONV (postoperative nausea and vomiting)     Vitamin D deficiency             Surgical History  Past Surgical History:   Procedure Laterality Date    BUNIONECTOMY Bilateral     CERVICAL DISCECTOMY  2018    C6    COLONOSCOPY  2020    TONSILLECTOMY      VASECTOMY               Family history:  Family History   Problem Relation Name Age of Onset    Diabetes Mother      Heart failure Father      Diabetes Father      No Known Problems Sister      Diabetes Sister      Diabetes Sister      No Known Problems Brother      Diabetes Brother      Diabetes Brother      No Known Problems Daughter      No Known Problems Son      ADD / ADHD Son          Social history:  Social History     Socioeconomic History    Marital status:      Spouse name: zaki    Number of children: 3    Years of education: Not on file    Highest education level: Not on file   Occupational History    Occupation: employed   Tobacco Use    Smoking status: Former     Current packs/day: 0.00     Average packs/day: 1 pack/day for 2.0 years (2.0 ttl pk-yrs)     Types: Cigarettes     Start date: 1988     Quit date: 1990     Years since quittin.3     Passive exposure: Past    Smokeless tobacco: Former     Types: Snuff     Quit date: 2010    Tobacco comments:     Nicotine gum   Vaping Use    Vaping status: Never Used   Substance and Sexual Activity    Alcohol use: Yes     Comment: 1-2 drinks once a year    Drug use: Yes     Types: Marijuana     Comment: in his 20--30's    Sexual activity: Defer   Other Topics Concern    Not on file   Social History Narrative    Not on file     Social Drivers of  "Health     Financial Resource Strain: Not on file   Food Insecurity: Not on file   Transportation Needs: Not on file   Physical Activity: Not on file   Stress: Not on file   Social Connections: Not on file   Intimate Partner Violence: Not on file   Housing Stability: Not on file        Current Outpatient Medications   Medication Instructions    acetaminophen (TYLENOL 8 HOUR) 650 mg, oral, Every 8 hours PRN, Do not crush, chew, or split.    ferrous sulfate 325 mg, Daily with breakfast    ibuprofen 400 mg, Every 6 hours PRN    melatonin 5 mg capsule 1 capsule, As needed    multivitamin tablet 1 tablet, Daily    nicotine polacrilex (NICORETTE) 2 mg, As needed    OREGANO OIL ORAL Daily    SAW PALMETTO ORAL 1 capsule, Daily    tadalafil 20 mg, oral, Daily PRN    tamsulosin (FLOMAX) 0.4 mg, Nightly     Visit Vitals  /88   Pulse 72   Temp 37.1 °C (98.7 °F) (Temporal)   Resp 18   Ht 1.746 m (5' 8.75\")   Wt 77 kg (169 lb 12.1 oz)   SpO2 98%   BMI 25.25 kg/m²   Smoking Status Former   BSA 1.93 m²       Review of Systems   Constitutional: Negative.    HENT: Negative.     Eyes: Negative.    Respiratory: Negative.     Cardiovascular: Negative.         METS 4 +stairs / walk / daily activities Without chest pain / SOB - limited by foot pain only    Gastrointestinal: Negative.    Endocrine: Negative.    Genitourinary: Negative.    Musculoskeletal: Negative.         Left foot pain    Skin: Negative.    Allergic/Immunologic: Negative.    Neurological: Negative.    Hematological: Negative.    Psychiatric/Behavioral: Negative.          Physical Exam  Constitutional:       Appearance: Normal appearance.   HENT:      Head: Normocephalic and atraumatic.      Right Ear: External ear normal.      Left Ear: External ear normal.      Nose: Nose normal.      Mouth/Throat:      Pharynx: Oropharynx is clear.   Eyes:      Conjunctiva/sclera: Conjunctivae normal.   Cardiovascular:      Rate and Rhythm: Normal rate and regular rhythm.      " Heart sounds: Normal heart sounds.   Pulmonary:      Effort: Pulmonary effort is normal.      Breath sounds: Normal breath sounds.   Abdominal:      General: Abdomen is flat.      Palpations: Abdomen is soft.   Musculoskeletal:         General: Normal range of motion.      Cervical back: Normal range of motion and neck supple.   Skin:     General: Skin is warm and dry.   Neurological:      General: No focal deficit present.      Mental Status: He is alert and oriented to person, place, and time.   Psychiatric:         Mood and Affect: Mood normal.         Behavior: Behavior normal.         Thought Content: Thought content normal.         Judgment: Judgment normal.          PAT AIRWAY:   Airway:     Mallampati::  I    Neck ROM::  Full        Patient is scheduled to have Left Foot Plantar Fasciectomy - Left  Left Ankle Muscle Gastrocnemius Recession - Left     Cardiovascular  METS: 4   RCRI: 0  , 3.9 % Risk of MACE  Caprini: 4    Pulmonary:  No pulmonary diagnosis or significant findings on chart review or clinical presentation.  No further preoperative testing is indicated at this time.    Stop Bang score is 2 placing patient at low risk for FANTASMA  ARISCAT: <26 points, 1.6% risk of in-hospital postoperative pulmonary complication  PRODIGY: Moderate risk for opioid induced respiratory depression     Neurology  No neurologic diagnosis or significant findings on chart review, clinical presentation and evaluation.  No grossly apparent neurologic perioperative risk.    Patient is not at increased risk for perioperative CVA     Renal  BPH- tamsulosin Continue DOS     Hematology       Patient instructed to ambulate as soon as possible postoperatively to decrease thromboembolic risk.      Initiate mechanical DVT prophylaxis as soon as possible and initiate chemical prophylaxis when deemed safe from a bleeding standpoint post surgery.       VTE prophylaxis per surgical team         Tests ordered in PAT: cbc, bmp       Risk  assessment complete.  Patient is scheduled for a low surgical risk procedure.        Preoperative medication instructions were provided and reviewed with the patient.  Any additional testing or evaluation was explained to the patient.  Nothing by mouth instructions were discussed and patient's questions were answered prior to conclusion to this encounter.  Patient verbalized understanding of preoperative instructions given in preadmission testing; discharge instructions available in EMR.

## 2025-03-28 NOTE — CPM/PAT NURSE NOTE
CPM/PAT Nurse Note      Name: Marlon Stoddard (Marlon Stoddard)  /Age: 1970/54 y.o.       Past Medical History:   Diagnosis Date    Adverse effect of anesthesia     urinary retention    Anemia     daily iron, saw hematology Dr. Fox 24; H/H= 12.3/38.4 on 24; Free thyroxine, ferritin and Iron&TIBC all WNL on 24    Anxiety     no meds, uses breathing technique    BPH (benign prostatic hyperplasia)     Chronic foot pain, left     Plan: Left Foot Plantar Fasciectomy - Left Left Ankle Muscle Gastrocnemius Recession - Left; 25;    Diverticulosis     last acute few weeks back - has finished antibiotics and has upcomng colonoscopy    ED (erectile dysfunction)     Elevated LFTs     under observation    Gastrocnemius equinus of left lower extremity     Plan: Left Foot Plantar Fasciectomy - Left Left Ankle Muscle Gastrocnemius Recession - Left; 25;    GERD (gastroesophageal reflux disease)     minor    Hypertension     off meds ~8 months - previously on as preventative for family hx    Hypokalemia     Nephrolithiasis     Plantar fasciitis     PONV (postoperative nausea and vomiting)     Vitamin D deficiency        Past Surgical History:   Procedure Laterality Date    BUNIONECTOMY Bilateral     CERVICAL DISCECTOMY      C6    COLONOSCOPY      TONSILLECTOMY      VASECTOMY         Patient Sexual activity questions deferred to the physician.    Family History   Problem Relation Name Age of Onset    Diabetes Mother      Heart failure Father      Diabetes Father      No Known Problems Sister      Diabetes Sister      Diabetes Sister      No Known Problems Brother      Diabetes Brother      Diabetes Brother      No Known Problems Daughter      No Known Problems Son      ADD / ADHD Son         No Known Allergies    Prior to Admission medications    Medication Sig Start Date End Date Taking? Authorizing Provider   acetaminophen (Tylenol 8 HOUR) 650 mg ER tablet Take 1 tablet (650 mg) by mouth  every 8 hours if needed for mild pain (1 - 3). Do not crush, chew, or split.    Historical Provider, MD   ciprofloxacin (Cipro) 500 mg tablet Take 1 tablet (500 mg) by mouth 2 times a day for 7 days. 3/14/25 3/21/25  Jignesh Way DO   ferrous sulfate, 325 mg ferrous sulfate, tablet Take 1 tablet (325 mg) by mouth once daily with breakfast.  Patient not taking: Reported on 3/28/2025    Historical Provider, MD   ibuprofen 200 mg tablet Take 2 tablets (400 mg) by mouth every 6 hours if needed for mild pain (1 - 3).    Historical Provider, MD   melatonin 5 mg capsule Take 1 capsule (5 mg) by mouth if needed.    Historical Provider, MD   metroNIDAZOLE (Flagyl) 500 mg tablet Take 1 tablet (500 mg) by mouth 3 times a day for 7 days. 3/14/25 3/21/25  Jingesh Way DO   multivitamin tablet Take 1 tablet by mouth once daily.    Historical Provider, MD   nicotine polacrilex (Nicorette) 4 mg gum Chew 2 mg if needed for smoking cessation.    Historical Provider, MD   OREGANO OIL ORAL Take by mouth once daily.    Historical Provider, MD   SAW PALMETTO ORAL Take 1 capsule by mouth once daily.    Historical Provider, MD   tadalafil 20 mg tablet Take 1 tablet (20 mg) by mouth once daily as needed for erectile dysfunction. 12/10/24   Jignesh Way DO   tamsulosin (Flomax) 0.4 mg 24 hr capsule Take 1 capsule (0.4 mg) by mouth once daily at bedtime.    Historical Provider, MD   olopatadine HCl (EYE ALLERGY ITCH-REDNESS RLF OPHT) Administer into affected eye(s) if needed.  3/28/25  Historical Provider, MD ERIN TAVAREZ     DASI Risk Score    No data to display       Caprini DVT Assessment    No data to display       Modified Frailty Index    No data to display       [id number] Stroke Risk Points  Current as of just now        N/A 0 to 9 Points:      Last Change: N/A          The [id number] risk score (Lip DRAKE, et al. 2009. © 2010 [employer]) quantifies the risk of stroke for a patient with atrial  fibrillation. For patients without atrial fibrillation or under the age of 18 this score appears as N/A. Higher score values generally indicate higher risk of stroke.        This score is not applicable to this patient. Components are not calculated.          Revised Cardiac Risk Index    No data to display       Apfel Simplified Score    No data to display       Risk Analysis Index Results This Encounter    No data found in the last 10 encounters.       Stop Bang Score      Flowsheet Row Pre-Admission Testing from 3/28/2025 in [facility] with Anastasia Riley PA-C   Do you snore loudly? 0 filed at 03/28/2025 1448   Do you often feel tired or fatigued after your sleep? 0 filed at 03/28/2025 1448   Has anyone ever observed you stop breathing in your sleep? 0 filed at 03/28/2025 1448   Do you have or are you being treated for high blood pressure? 0 filed at 03/28/2025 1448   Recent BMI (Calculated) 25.3 filed at 03/28/2025 1448   Is BMI greater than 35 kg/m2? 0=No filed at 03/28/2025 1448   Age older than 50 years old? 1=Yes filed at 03/28/2025 1448   Is your neck circumference greater than 17 inches (Male) or 16 inches (Female)? 0 filed at 03/28/2025 1448   Gender - Male 1=Yes filed at 03/28/2025 1448   STOP-BANG Total Score 2 filed at 03/28/2025 1448          Prodigy: High Risk  Total Score: 8              Prodigy Gender Score          ARISCAT Score for Postoperative Pulmonary Complications      Flowsheet Row Pre-Admission Testing from 3/28/2025 in [facility] with Anastasia Riley PA-C   Age Calculated Score 3 filed at 03/28/2025 1450   Preoperative SpO2 0 filed at 03/28/2025 1450   Respiratory infection in the last month Either upper or lower (i.e., URI, bronchitis, pneumonia), with fever and antibiotic treatment 0 filed at 03/28/2025 1450   Preoperative anemia (Hgb less than 10 g/dl) 0 filed at 03/28/2025 1450   Surgical incision  0 filed at 03/28/2025 1450   Duration of surgery  0  filed at 03/28/2025 1450   Emergency Procedure  0 filed at 03/28/2025 1450   ARISCAT Total Score  3 filed at 03/28/2025 1450          Scot Perioperative Risk for Myocardial Infarction or Cardiac Arrest (JAYY)    No data to display         Nurse Plan of Action: After Visit Summary (AVS) reviewed and patient verbalized good understanding of medications and NPO instructions.

## 2025-04-06 ENCOUNTER — ANESTHESIA EVENT (OUTPATIENT)
Dept: OPERATING ROOM | Facility: HOSPITAL | Age: 55
End: 2025-04-06
Payer: COMMERCIAL

## 2025-04-07 ENCOUNTER — ANESTHESIA (OUTPATIENT)
Dept: OPERATING ROOM | Facility: HOSPITAL | Age: 55
End: 2025-04-07
Payer: COMMERCIAL

## 2025-04-07 ENCOUNTER — HOSPITAL ENCOUNTER (OUTPATIENT)
Facility: HOSPITAL | Age: 55
Setting detail: OUTPATIENT SURGERY
Discharge: HOME | End: 2025-04-07
Attending: ORTHOPAEDIC SURGERY | Admitting: ORTHOPAEDIC SURGERY
Payer: COMMERCIAL

## 2025-04-07 VITALS
OXYGEN SATURATION: 97 % | HEART RATE: 75 BPM | TEMPERATURE: 97.3 F | SYSTOLIC BLOOD PRESSURE: 120 MMHG | RESPIRATION RATE: 14 BRPM | WEIGHT: 169.75 LBS | HEIGHT: 69 IN | DIASTOLIC BLOOD PRESSURE: 78 MMHG | BODY MASS INDEX: 25.14 KG/M2

## 2025-04-07 DIAGNOSIS — M72.2 PLANTAR FASCIITIS: ICD-10-CM

## 2025-04-07 DIAGNOSIS — M62.469 GASTROCNEMIUS EQUINUS, UNSPECIFIED LATERALITY: Primary | ICD-10-CM

## 2025-04-07 PROCEDURE — 2720000007 HC OR 272 NO HCPCS: Performed by: ORTHOPAEDIC SURGERY

## 2025-04-07 PROCEDURE — 3700000002 HC GENERAL ANESTHESIA TIME - EACH INCREMENTAL 1 MINUTE: Performed by: ORTHOPAEDIC SURGERY

## 2025-04-07 PROCEDURE — 7100000009 HC PHASE TWO TIME - INITIAL BASE CHARGE: Performed by: ORTHOPAEDIC SURGERY

## 2025-04-07 PROCEDURE — 2500000005 HC RX 250 GENERAL PHARMACY W/O HCPCS: Performed by: ANESTHESIOLOGIST ASSISTANT

## 2025-04-07 PROCEDURE — 3600000002 HC OR TIME - INITIAL BASE CHARGE - PROCEDURE LEVEL TWO: Performed by: ORTHOPAEDIC SURGERY

## 2025-04-07 PROCEDURE — 3600000007 HC OR TIME - EACH INCREMENTAL 1 MINUTE - PROCEDURE LEVEL TWO: Performed by: ORTHOPAEDIC SURGERY

## 2025-04-07 PROCEDURE — 2500000005 HC RX 250 GENERAL PHARMACY W/O HCPCS: Performed by: ORTHOPAEDIC SURGERY

## 2025-04-07 PROCEDURE — 2500000004 HC RX 250 GENERAL PHARMACY W/ HCPCS (ALT 636 FOR OP/ED): Performed by: ANESTHESIOLOGIST ASSISTANT

## 2025-04-07 PROCEDURE — 2500000001 HC RX 250 WO HCPCS SELF ADMINISTERED DRUGS (ALT 637 FOR MEDICARE OP): Performed by: STUDENT IN AN ORGANIZED HEALTH CARE EDUCATION/TRAINING PROGRAM

## 2025-04-07 PROCEDURE — 7100000001 HC RECOVERY ROOM TIME - INITIAL BASE CHARGE: Performed by: ORTHOPAEDIC SURGERY

## 2025-04-07 PROCEDURE — A28060 PR PART EXCIS PLANTAR FASCIA: Performed by: ANESTHESIOLOGIST ASSISTANT

## 2025-04-07 PROCEDURE — 3700000001 HC GENERAL ANESTHESIA TIME - INITIAL BASE CHARGE: Performed by: ORTHOPAEDIC SURGERY

## 2025-04-07 PROCEDURE — 7100000010 HC PHASE TWO TIME - EACH INCREMENTAL 1 MINUTE: Performed by: ORTHOPAEDIC SURGERY

## 2025-04-07 PROCEDURE — A28060 PR PART EXCIS PLANTAR FASCIA: Performed by: ANESTHESIOLOGY

## 2025-04-07 PROCEDURE — 2500000002 HC RX 250 W HCPCS SELF ADMINISTERED DRUGS (ALT 637 FOR MEDICARE OP, ALT 636 FOR OP/ED): Performed by: STUDENT IN AN ORGANIZED HEALTH CARE EDUCATION/TRAINING PROGRAM

## 2025-04-07 PROCEDURE — 7100000002 HC RECOVERY ROOM TIME - EACH INCREMENTAL 1 MINUTE: Performed by: ORTHOPAEDIC SURGERY

## 2025-04-07 PROCEDURE — 28060 PARTIAL REMOVAL FOOT FASCIA: CPT | Performed by: ORTHOPAEDIC SURGERY

## 2025-04-07 RX ORDER — CEFAZOLIN 1 G/1
INJECTION, POWDER, FOR SOLUTION INTRAVENOUS AS NEEDED
Status: DISCONTINUED | OUTPATIENT
Start: 2025-04-07 | End: 2025-04-07

## 2025-04-07 RX ORDER — SODIUM CHLORIDE, SODIUM LACTATE, POTASSIUM CHLORIDE, CALCIUM CHLORIDE 600; 310; 30; 20 MG/100ML; MG/100ML; MG/100ML; MG/100ML
INJECTION, SOLUTION INTRAVENOUS CONTINUOUS PRN
Status: DISCONTINUED | OUTPATIENT
Start: 2025-04-07 | End: 2025-04-07

## 2025-04-07 RX ORDER — ONDANSETRON HYDROCHLORIDE 2 MG/ML
4 INJECTION, SOLUTION INTRAVENOUS ONCE AS NEEDED
Status: DISCONTINUED | OUTPATIENT
Start: 2025-04-07 | End: 2025-04-07 | Stop reason: HOSPADM

## 2025-04-07 RX ORDER — SODIUM CHLORIDE, SODIUM LACTATE, POTASSIUM CHLORIDE, CALCIUM CHLORIDE 600; 310; 30; 20 MG/100ML; MG/100ML; MG/100ML; MG/100ML
100 INJECTION, SOLUTION INTRAVENOUS CONTINUOUS
Status: DISCONTINUED | OUTPATIENT
Start: 2025-04-07 | End: 2025-04-07 | Stop reason: HOSPADM

## 2025-04-07 RX ORDER — LIDOCAINE HYDROCHLORIDE 20 MG/ML
INJECTION, SOLUTION INFILTRATION; PERINEURAL AS NEEDED
Status: DISCONTINUED | OUTPATIENT
Start: 2025-04-07 | End: 2025-04-07

## 2025-04-07 RX ORDER — APREPITANT 40 MG/1
40 CAPSULE ORAL ONCE
Status: COMPLETED | OUTPATIENT
Start: 2025-04-07 | End: 2025-04-07

## 2025-04-07 RX ORDER — OXYCODONE HYDROCHLORIDE 5 MG/1
5 TABLET ORAL EVERY 6 HOURS PRN
Qty: 28 TABLET | Refills: 0 | Status: SHIPPED | OUTPATIENT
Start: 2025-04-07 | End: 2025-04-14

## 2025-04-07 RX ORDER — LABETALOL HYDROCHLORIDE 5 MG/ML
5 INJECTION, SOLUTION INTRAVENOUS ONCE AS NEEDED
Status: DISCONTINUED | OUTPATIENT
Start: 2025-04-07 | End: 2025-04-07 | Stop reason: HOSPADM

## 2025-04-07 RX ORDER — PROPOFOL 10 MG/ML
INJECTION, EMULSION INTRAVENOUS AS NEEDED
Status: DISCONTINUED | OUTPATIENT
Start: 2025-04-07 | End: 2025-04-07

## 2025-04-07 RX ORDER — MELOXICAM 7.5 MG/1
7.5 TABLET ORAL DAILY
Qty: 14 TABLET | Refills: 0 | Status: SHIPPED | OUTPATIENT
Start: 2025-04-07 | End: 2025-04-21

## 2025-04-07 RX ORDER — KETOROLAC TROMETHAMINE 30 MG/ML
INJECTION, SOLUTION INTRAMUSCULAR; INTRAVENOUS AS NEEDED
Status: DISCONTINUED | OUTPATIENT
Start: 2025-04-07 | End: 2025-04-07

## 2025-04-07 RX ORDER — LIDOCAINE HYDROCHLORIDE 10 MG/ML
0.1 INJECTION, SOLUTION EPIDURAL; INFILTRATION; INTRACAUDAL; PERINEURAL ONCE
Status: DISCONTINUED | OUTPATIENT
Start: 2025-04-07 | End: 2025-04-07 | Stop reason: HOSPADM

## 2025-04-07 RX ORDER — ONDANSETRON HYDROCHLORIDE 2 MG/ML
INJECTION, SOLUTION INTRAVENOUS AS NEEDED
Status: DISCONTINUED | OUTPATIENT
Start: 2025-04-07 | End: 2025-04-07

## 2025-04-07 RX ORDER — SODIUM CHLORIDE 0.9 G/100ML
INJECTION, SOLUTION IRRIGATION AS NEEDED
Status: DISCONTINUED | OUTPATIENT
Start: 2025-04-07 | End: 2025-04-07 | Stop reason: HOSPADM

## 2025-04-07 RX ORDER — NAPROXEN SODIUM 220 MG/1
81 TABLET, FILM COATED ORAL 2 TIMES DAILY
Qty: 28 TABLET | Refills: 0 | Status: SHIPPED | OUTPATIENT
Start: 2025-04-07 | End: 2025-04-21

## 2025-04-07 RX ORDER — OXYCODONE HYDROCHLORIDE 5 MG/1
5 TABLET ORAL EVERY 4 HOURS PRN
Status: DISCONTINUED | OUTPATIENT
Start: 2025-04-07 | End: 2025-04-07 | Stop reason: HOSPADM

## 2025-04-07 RX ORDER — DIPHENHYDRAMINE HYDROCHLORIDE 50 MG/ML
12.5 INJECTION, SOLUTION INTRAMUSCULAR; INTRAVENOUS ONCE AS NEEDED
Status: DISCONTINUED | OUTPATIENT
Start: 2025-04-07 | End: 2025-04-07 | Stop reason: HOSPADM

## 2025-04-07 RX ORDER — FENTANYL CITRATE 50 UG/ML
INJECTION, SOLUTION INTRAMUSCULAR; INTRAVENOUS AS NEEDED
Status: DISCONTINUED | OUTPATIENT
Start: 2025-04-07 | End: 2025-04-07

## 2025-04-07 RX ORDER — DOCUSATE SODIUM 100 MG/1
100 CAPSULE, LIQUID FILLED ORAL 2 TIMES DAILY
Qty: 28 CAPSULE | Refills: 0 | Status: SHIPPED | OUTPATIENT
Start: 2025-04-07 | End: 2025-04-21

## 2025-04-07 RX ORDER — CEFAZOLIN SODIUM 2 G/50ML
2 SOLUTION INTRAVENOUS ONCE
Status: DISCONTINUED | OUTPATIENT
Start: 2025-04-07 | End: 2025-04-07 | Stop reason: HOSPADM

## 2025-04-07 RX ADMIN — SODIUM CHLORIDE, POTASSIUM CHLORIDE, SODIUM LACTATE AND CALCIUM CHLORIDE: 600; 310; 30; 20 INJECTION, SOLUTION INTRAVENOUS at 13:30

## 2025-04-07 RX ADMIN — KETOROLAC TROMETHAMINE 30 MG: 30 INJECTION, SOLUTION INTRAMUSCULAR at 14:01

## 2025-04-07 RX ADMIN — PROPOFOL 200 MG: 10 INJECTION, EMULSION INTRAVENOUS at 13:38

## 2025-04-07 RX ADMIN — DEXAMETHASONE SODIUM PHOSPHATE 8 MG: 4 INJECTION, SOLUTION INTRAMUSCULAR; INTRAVENOUS at 13:45

## 2025-04-07 RX ADMIN — LIDOCAINE HYDROCHLORIDE 100 MG: 20 INJECTION, SOLUTION INFILTRATION; PERINEURAL at 13:38

## 2025-04-07 RX ADMIN — FENTANYL CITRATE 25 MCG: 50 INJECTION, SOLUTION INTRAMUSCULAR; INTRAVENOUS at 13:49

## 2025-04-07 RX ADMIN — OXYCODONE HYDROCHLORIDE 5 MG: 5 TABLET ORAL at 15:08

## 2025-04-07 RX ADMIN — FENTANYL CITRATE 25 MCG: 50 INJECTION, SOLUTION INTRAMUSCULAR; INTRAVENOUS at 13:47

## 2025-04-07 RX ADMIN — APREPITANT 40 MG: 40 CAPSULE ORAL at 13:12

## 2025-04-07 RX ADMIN — CEFAZOLIN 2 G: 1 INJECTION, POWDER, FOR SOLUTION INTRAMUSCULAR; INTRAVENOUS at 13:43

## 2025-04-07 RX ADMIN — ONDANSETRON 4 MG: 2 INJECTION, SOLUTION INTRAMUSCULAR; INTRAVENOUS at 13:59

## 2025-04-07 RX ADMIN — CARBOXYMETHYLCELLULOSE SODIUM 2 DROP: 0.5 SOLUTION/ DROPS OPHTHALMIC at 13:40

## 2025-04-07 ASSESSMENT — PAIN - FUNCTIONAL ASSESSMENT
PAIN_FUNCTIONAL_ASSESSMENT: 0-10

## 2025-04-07 ASSESSMENT — PAIN SCALES - GENERAL
PAINLEVEL_OUTOF10: 0 - NO PAIN
PAINLEVEL_OUTOF10: 3
PAINLEVEL_OUTOF10: 3
PAINLEVEL_OUTOF10: 0 - NO PAIN
PAINLEVEL_OUTOF10: 5 - MODERATE PAIN
PAINLEVEL_OUTOF10: 3
PAINLEVEL_OUTOF10: 0 - NO PAIN
PAINLEVEL_OUTOF10: 2

## 2025-04-07 ASSESSMENT — COLUMBIA-SUICIDE SEVERITY RATING SCALE - C-SSRS
2. HAVE YOU ACTUALLY HAD ANY THOUGHTS OF KILLING YOURSELF?: NO
1. IN THE PAST MONTH, HAVE YOU WISHED YOU WERE DEAD OR WISHED YOU COULD GO TO SLEEP AND NOT WAKE UP?: NO
6. HAVE YOU EVER DONE ANYTHING, STARTED TO DO ANYTHING, OR PREPARED TO DO ANYTHING TO END YOUR LIFE?: NO

## 2025-04-07 NOTE — DISCHARGE INSTRUCTIONS
Additional instructions  Ice/Elevate operative extremity.  Keep dressing clean and dry.    Keep dressing in place.  Weightbearing: NWB on operative extremity with crutches/walker.   Start Tylenol 650 mg by mouth every 6 hours today as needed for pain.  Oxycodone 1 by mouth every 6 hours as needed for pain when block wears off.  Aspirin 81 mg by mouth twice daily.  Meloxicam 7.5 mg daily for 14 days.  Colace twice a day for constipation.  F/U with Dr. Ivan in 2 weeks.  Call [phone] for appointment time.

## 2025-04-07 NOTE — OP NOTE
Left Foot Plantar Fasciectomy (L) Operative Note     Date: 2025  OR Location: [facility] A OR    Name: Marlon Stoddard, : 1970, Age: 54 y.o., MRN: [id number], Sex: male    Diagnosis  Pre-op Diagnosis      * Gastrocnemius equinus, unspecified laterality [M62.469]     * Plantar fasciitis [M72.2] Post-op Diagnosis     * Gastrocnemius equinus, unspecified laterality [M62.469]     * Plantar fasciitis [M72.2]     Procedures  Left Foot Plantar Fasciectomy  60937 - UT FASCIECTOMY PLANTAR FASCIA PARTIAL SPX      Surgeons      * Fernando Ivan - Primary    Resident/Fellow/Other Assistant:  Surgeons and Role:     * Rodo Yusuf MD - Resident - Assisting     * West Mock MD - Resident - Observing    Staff:   Circulator: Dana King Person: Marck    Anesthesia Staff: Anesthesiologist: Andi Jorgensen MD; Sim Witt MD  C-AA: SAVAGE Howell    Procedure Summary  Anesthesia: Anesthesia type not filed in the log.  ASA: II  Estimated Blood Loss: 1mL  Intra-op Medications:   Administrations occurring from 1400 to 1515 on 25:   Medication Name Total Dose   ketorolac (Toradol) injection 30 mg 30 mg   lactated Ringer's infusion Cannot be calculated              Anesthesia Record               Intraprocedure I/O Totals          Intake    lactated Ringer's 300.00 mL    Total Intake 300 mL       Output    Est. Blood Loss 2 mL    Total Output 2 mL       Net    Net Volume 298 mL          Specimen: No specimens collected              Drains and/or Catheters: * None in log *    Tourniquet Times:     Total Tourniquet Time Documented:  Calf (Left) - 20 minutes  Total: Calf (Left) - 20 minutes      Implants:     Findings: Thickened plantar fascia.    Indications: Marlon Stoddard is an 54 y.o. male who is having surgery for Gastrocnemius equinus, unspecified laterality [M62.469]  Plantar fasciitis [M72.2].  Chronic planta fasciitis left heel.  Here for surgical release.  Discussed possible gastroc lengthening but  I did not feel that he did tightness on the day of surgery.  Understood the risk of surgery in detail.    The patient was seen in the preoperative area. The risks, benefits, complications, treatment options, non-operative alternatives, expected recovery and outcomes were discussed with the patient. The possibilities of reaction to medication, pulmonary aspiration, injury to surrounding structures, bleeding, recurrent infection, the need for additional procedures, failure to diagnose a condition, and creating a complication requiring transfusion or operation were discussed with the patient. The patient concurred with the proposed plan, giving informed consent.  The site of surgery was properly noted/marked if necessary per policy. The patient has been actively warmed in preoperative area. Preoperative antibiotics have been ordered and given within 1 hours of incision. Venous thrombosis prophylaxis are not indicated.    Procedure Details:   Preop DX: Chronic plantar fasciitis left foot  Postop DX: Same  Procedure: Open left partial plantar fasciectomy left heel  Surgeon: Fernando Ivan MD  Asst: Rodo Yusuf MD  Anesthesia: General LMA with popliteal block  Clinical Note: 54-year-old male with chronic plantar fasciitis left heel.  Failed conservative treatment.  MRI evidence of thickened plantar fascia.  Here for surgical release.  Discussed possible gastroc lengthening but do not feel that he is that tight on the day of surgery.  Understood the risk of surgically bleeding, infection, stiffness, continued pain and possibly further surgery or shoewear modifications.  Understood these risks and wished to proceed.  Procedure Note: Patient brought to operating room after popliteal block..  Timeout performed.  General anesthesia given with LMA.  Antibiotics given IV.  Left leg was prepped and draped in typical sterile fashion tourniquet on the.  Foot was exsanguinated tourniquet inflated to 275 mmHg.  Oblique incision made  over the medial aspect of the left heel.  Blunt dissection out of the deep muscular fascia.  Circumferential fasciotomy was performed on the sentinel fat around the short abductor and flexor muscle.  The thickened medial and central limb of the plantar fascia was visualized and released sharply.  There was a palpable intact lateral band of the plantar fascia.  Wound was irrigated.  Closed with nylon suture.  Dressed with Xeroform, fluffs and a short posterior splint/sugar-tong.  Patient tolerated procedure well and was taken recovery room in stable condition.    Complications:  None; patient tolerated the procedure well.    Disposition: PACU - hemodynamically stable.  Condition: stable                 Additional Details: None    Attending Attestation: I was present and scrubbed for the entire procedure.    Fernando Ivan  Phone Number: [phone]

## 2025-04-07 NOTE — ANESTHESIA PREPROCEDURE EVALUATION
Patient: Marlon Stoddard    Procedure Information       Date/Time: 04/07/25 6420    Procedures:       Left Foot Plantar Fasciectomy (Left: Foot)      Left Ankle Muscle Gastrocnemius Recession (Left: Ankle)    Location: [facility] A OR 17 / Virtual [facility] A OR    Surgeons: Fernando Ivan MD            Relevant Problems   Cardiac   (+) Primary hypertension       Clinical information reviewed:                   NPO Detail:  No data recorded     PHYSICAL EXAM    Anesthesia Plan    History of general anesthesia?: yes  History of complications of general anesthesia?: no    ASA 2     general   (With nerve block - 2mg versed [id number] mcg fentanyl given)  intravenous induction   Anesthetic plan and risks discussed with patient.    Plan discussed with CRNA.

## 2025-04-07 NOTE — ANESTHESIA PROCEDURE NOTES
Airway  Date/Time: 4/7/2025 1:40 PM  Urgency: elective      Staffing  Performed: SAVAGE   Authorized by: Sim Witt MD    Performed by: SAVAGE Howell  Patient location during procedure: OR    Indications and Patient Condition  Indications for airway management: anesthesia  Spontaneous Ventilation: absent  Sedation level: deep  Preoxygenated: yes  Mask difficulty assessment: 0 - not attempted    Final Airway Details  Final airway type: supraglottic airway      Successful airway: Supraglottic airway: iGel.  Size 5     Number of attempts at approach: 1

## 2025-04-07 NOTE — ANESTHESIA POSTPROCEDURE EVALUATION
Patient: Marlon Stoddard    Procedure Summary       Date: 04/07/25 Room / [facility]: [facility] OR 07 / [facility] OR    Anesthesia Start: 1333 Anesthesia Stop: 1428    Procedure: Left Foot Plantar Fasciectomy (Left: Foot) Diagnosis:       Gastrocnemius equinus, unspecified laterality      Plantar fasciitis      (Gastrocnemius equinus, unspecified laterality [M62.469])      (Plantar fasciitis [M72.2])    Surgeons: Fernando Ivan MD Responsible Provider: Sim Witt MD    Anesthesia Type: general ASA Status: 2            Anesthesia Type: general    Vitals Value Taken Time   /82 04/07/25 1515   Temp 36 °C (96.8 °F) 04/07/25 1424   Pulse 72 04/07/25 1528   Resp 15 04/07/25 1528   SpO2 98 % 04/07/25 1528   Vitals shown include unfiled device data.    Anesthesia Post Evaluation    Patient location during evaluation: PACU  Patient participation: complete - patient participated  Level of consciousness: awake and alert  Pain management: adequate  Airway patency: patent  Cardiovascular status: acceptable and hemodynamically stable  Respiratory status: acceptable, spontaneous ventilation and nonlabored ventilation  Hydration status: acceptable  Postoperative Nausea and Vomiting: none        There were no known notable events for this encounter.

## 2025-04-08 ASSESSMENT — PAIN SCALES - GENERAL: PAINLEVEL_OUTOF10: 0 - NO PAIN

## 2025-04-11 RX ORDER — FENTANYL CITRATE 50 UG/ML
INJECTION, SOLUTION INTRAMUSCULAR; INTRAVENOUS CONTINUOUS PRN
Status: DISCONTINUED | OUTPATIENT
Start: 2025-04-11 | End: 2025-04-11

## 2025-04-11 NOTE — ANESTHESIA PROCEDURE NOTES
Peripheral Block    Patient location during procedure: pre-op  Start time: 4/7/2025 1:00 PM  End time: 4/7/2025 1:01 PM  Reason for block: at surgeon's request and post-op pain management  Staffing  Performed: attending   Authorized by: Sim Witt MD    Performed by: Andi Jorgensen MD  Preanesthetic Checklist  Completed: patient identified, IV checked, site marked, risks and benefits discussed, surgical consent, monitors and equipment checked, pre-op evaluation and timeout performed   Timeout performed at:   Peripheral Block  Patient position: laying flat  Prep: ChloraPrep and site prepped and draped  Patient monitoring: continuous pulse ox, heart rate and cardiac monitor  Block type: popliteal  Laterality: left  Injection technique: single-shot  Guidance: ultrasound guided  Local infiltration: lidocaine  Infiltration strength: 1 %  Dose: 3 mL  Needle  Needle type: short-bevel   Needle gauge: 22 G  Needle length: 8 cm  Needle localization: ultrasound guidance  Test dose: negative  Assessment  Injection assessment: negative aspiration for heme, local visualized surrounding nerve on ultrasound, no paresthesia on injection and incremental injection  Heart rate change: no  Slow fractionated injection: yes      Dictation #1  MRN:[id number]  [facility]:[id number]

## 2025-04-11 NOTE — ADDENDUM NOTE
Addendum  created 04/11/25 0859 by Andi Jorgensen MD    Child order released for a procedure order, Clinical Note Signed, Intraprocedure Blocks edited, SmartForm saved

## 2025-04-11 NOTE — ADDENDUM NOTE
Addendum  created 04/11/25 1042 by Andi Jorgensen MD    Clinical Note Signed, Narcotic reconciliation edited

## 2025-04-17 ENCOUNTER — OFFICE VISIT (OUTPATIENT)
Dept: ORTHOPEDIC SURGERY | Facility: CLINIC | Age: 55
End: 2025-04-17
Payer: COMMERCIAL

## 2025-04-17 DIAGNOSIS — M72.2 PLANTAR FASCIITIS: Primary | ICD-10-CM

## 2025-04-17 PROCEDURE — L4361 PNEUMA/VAC WALK BOOT PRE OTS: HCPCS | Performed by: ORTHOPAEDIC SURGERY

## 2025-04-17 PROCEDURE — 99211 OFF/OP EST MAY X REQ PHY/QHP: CPT | Performed by: ORTHOPAEDIC SURGERY

## 2025-04-17 PROCEDURE — 1036F TOBACCO NON-USER: CPT | Performed by: ORTHOPAEDIC SURGERY

## 2025-04-17 NOTE — PROGRESS NOTES
S: Pain well controlled.     O: Incisions healing nicely.  Minimal pain over plantar fascia.  Good ankle dorsiflexion.    A: S/P left plantar fascia release     P:sutures out.  Steri-Strips.  Cast boot.  Begin passive stretching.  Prescription for therapy to start after next visit.  Follow-up in 2 weeks.

## 2025-05-01 ENCOUNTER — OFFICE VISIT (OUTPATIENT)
Dept: ORTHOPEDIC SURGERY | Facility: CLINIC | Age: 55
End: 2025-05-01
Payer: COMMERCIAL

## 2025-05-01 VITALS — WEIGHT: 169 LBS | BODY MASS INDEX: 25.14 KG/M2

## 2025-05-01 DIAGNOSIS — M72.2 PLANTAR FASCIITIS: Primary | ICD-10-CM

## 2025-05-01 PROCEDURE — 99211 OFF/OP EST MAY X REQ PHY/QHP: CPT | Performed by: ORTHOPAEDIC SURGERY

## 2025-05-01 PROCEDURE — 1036F TOBACCO NON-USER: CPT | Performed by: ORTHOPAEDIC SURGERY

## 2025-05-01 ASSESSMENT — PAIN SCALES - GENERAL: PAINLEVEL_OUTOF10: 1

## 2025-05-01 NOTE — PROGRESS NOTES
Starting therapy in a couple weeks.  Pain much improved.    Exam: Incision well-healed.  Minimally tender plantar fascia.    Assessment: Status post left plantar fascia release.    Plan: Can progress weightbearing in the boot.  Become more aggressive with the stretching on his own until therapy starts.  Follow-up examination 1 month.

## 2025-05-07 ENCOUNTER — TELEPHONE (OUTPATIENT)
Dept: PHYSICAL THERAPY | Facility: CLINIC | Age: 55
End: 2025-05-07

## 2025-05-07 ENCOUNTER — TELEPHONE (OUTPATIENT)
Dept: PHYSICAL THERAPY | Facility: CLINIC | Age: 55
End: 2025-05-07
Payer: COMMERCIAL

## 2025-05-14 ENCOUNTER — EVALUATION (OUTPATIENT)
Dept: PHYSICAL THERAPY | Facility: CLINIC | Age: 55
End: 2025-05-14
Payer: COMMERCIAL

## 2025-05-14 DIAGNOSIS — M72.2 PLANTAR FASCIITIS: ICD-10-CM

## 2025-05-14 PROCEDURE — 97161 PT EVAL LOW COMPLEX 20 MIN: CPT | Mod: GP | Performed by: PHYSICAL THERAPIST

## 2025-05-14 PROCEDURE — 97110 THERAPEUTIC EXERCISES: CPT | Mod: GP | Performed by: PHYSICAL THERAPIST

## 2025-05-14 ASSESSMENT — PAIN - FUNCTIONAL ASSESSMENT: PAIN_FUNCTIONAL_ASSESSMENT: 0-10

## 2025-05-14 ASSESSMENT — PAIN SCALES - GENERAL: PAINLEVEL_OUTOF10: 4

## 2025-05-14 NOTE — PROGRESS NOTES
Physical Therapy Evaluation and Treatment     Patient Name: Marlon Stoddard  MRN: [id number]  Encounter date: 2025  Time Calculation  Start Time: 1717  Stop Time: 1805  Time Calculation (min): 48 min  PT Evaluation Time Entry  PT Evaluation (Low) Time Entry: 30  PT Therapeutic Procedures Time Entry  Therapeutic Exercise Time Entry: 18  Low complexity due to patient's clinical presentation being stable and uncomplicated by any significant comorbidities that may affect rehab tolerance and progression.     Visit # 1 of 10  Visits/Dates Authorized:  [facility]: NO AUTH/ $1300 DED (MET)/ 80% COVERAGE/ 30 VISIT MX PER YEAR/ $3900 OOP (NM) PER [facility] SITE REF# [id number]  Insurance Type: Payor: [facility] / Plan: [facility] / Product Type: *No Product type* /     Current Problem:   Problem List Items Addressed This Visit           ICD-10-CM    Plantar fasciitis M72.2    Relevant Orders    Follow Up In Physical Therapy     Precautions:     Past Medical History Relevant to Rehab: PMH HTN, Gatrocnemius equinus pantar fasciitis, GERD, BPH    Subjective    Subjective Evaluation    History of Present Illness  Date of onset: 2025  Date of surgery: 2025  Mechanism of injury: Pt is a 54 yo M with a long h/o of plantar fasciitis L foot.  Pt has been seen for foot and back; however, symptoms persist.  Pt with L plantar fascial release on 25.  Pt currently wearing walking cast boot with B axillary crutches.  Pt states that Dr has him weaning into shoe.  Pt tolerating 2 hours in shoe.  The Dr would like him to be in a shoe by next office visit on 25    Quality of life: good    Pain  Current pain ratin  At best pain ratin  At worst pain ratin  Location: plantar surface of L foot and dosal surface into 2nd and 3rd didgits.  Quality: burning, radiating and dull ache  Relieving factors: change in position, ice, medications, rest and support  Aggravating factors: movement (weight  bearing)    Social Support  Lives in: multiple-level home  Lives with: spouse and adult children    Hand dominance: right    Treatments  Previous treatment: physical therapy (for L foot and back)  Patient Goals  Patient goal: To be able to walk for more than 5 minutes before needing to sit         Objective   Pain Assessment: 0-10  0-10 (Numeric) Pain Score: 4  Objective     Observations   Left Ankle/Foot   Positive for edema and incision.     Additional Observation Details  Incision is well healed    Palpation     Additional Palpation Details  Pt with sensitivity plantar surface of foot and through dorsal aspect of 2nd and 3rd digits    Active Range of Motion   Left Ankle/Foot   Dorsiflexion (ke): 10 degrees   Dorsiflexion (kf): 12 degrees   Plantar flexion: 40 degrees   Inversion: 10 degrees   Eversion: 20 degrees     Right Ankle/Foot   Dorsiflexion (ke): 12 degrees   Dorsiflexion (kf): 12 degrees   Plantar flexion: 65 degrees   Inversion: 23 degrees   Eversion: 20 degrees     Strength/Myotome Testing     Left Ankle/Foot   Dorsiflexion: 4  Plantar flexion: 4-  Inversion: 4-  Eversion: 4-    Right Ankle/Foot   Dorsiflexion: 5  Plantar flexion: 5  Inversion: 5  Eversion: 5    Swelling   Left Ankle/Foot   Figure 8: 67 cm    Right Ankle/Foot   Figure 8: 66.2 cm              Outcome Measures:    MEHUL 78%    Treatments:  Therapeutic Exercise  Therapeutic Exercise Performed: Yes  Therapeutic Exercise Activity 1: long sitting PF stretch with towel x3 20 ct hold  Therapeutic Exercise Activity 2: long sitting INV stretch with towel x3 20 ct hold  Therapeutic Exercise Activity 3: long sitting EV stretch with towel x3 20 ct hold  Therapeutic Exercise Activity 4: AROM DF/PF x20  Therapeutic Exercise Activity 5: AROM INV/EV x20  Therapeutic Exercise Activity 6: CW/CCW x20 each  Therapeutic Exercise Activity 7: weaning into shoe, use of one axillary crutch  Therapeutic Exercise Activity 8: desensitization with wash cloth and  silk scarf    HEP / Access Codes:     Assessment   Assessment & Plan     Assessment  Assessment details: Pt is a 54 yo M S/P L plantar fasciotomy on 4/7/25.   will like for pt to wean into shoe by 6/6/25 follow up visit.  Pt with some edema, pain, decreased PF, INV, EV, and decreased strength.  Pt with altered gait due to discomfort.  Pt will beneft from PT to address impairments, improve gait, and return to regular daily activities.  Prognosis: good    Goals  To be able to walk for more than 5 minutes before needing to sit    Plan  Therapy options: will be seen for skilled physical therapy services  Planned modality interventions: cryotherapy, microcurrent electrical stimulation, thermotherapy (hydrocollator packs) and ultrasound  Other planned modality interventions: K tape  Planned therapy interventions: balance/weight-bearing training, flexibility, functional ROM exercises, gait training, home exercise program, joint mobilization, manual therapy, neuromuscular re-education, soft tissue mobilization, strengthening, stretching and therapeutic activities  Frequency: 1x week  Duration in visits: 10  Duration in weeks: 10  Treatment plan discussed with: patient           Goals:   Active       PT Problem       PT Goals       Start:  05/14/25    Expected End:  08/14/25       STG's      1.  Pt to be independent with HEP in order to manage current condition  2.  Pt will report decreased pain L ankle from 4/10 to </= 1/10 with weight bearing and daily activities  3.  Pt will increased AROM of L ankle to equal R ankle in order to improve mobility with gait and stairs    LTGs    1.  Pt will increase strength of L LE ankle to 5/5 to equal R ankle in order to improve gait, stair climbing and stability on uneven surfaces    2.  Pt will ambulate with normal gait pattern for community distances to return to walking routine    3.  Pt will improve TUG to < 10 sec in order to demonstrate decreased fall risk    4.  Pt with  increase MEHUL from 78% function to 88% function to demonstrate functional limitations          Patient Stated Goal 1       Start:  05/14/25    Expected End:  08/14/25       Pt would like to walk greater than 5 min without having to sit due to pain

## 2025-05-15 SDOH — ECONOMIC STABILITY: GENERAL: QUALITY OF LIFE: GOOD

## 2025-05-15 ASSESSMENT — ENCOUNTER SYMPTOMS
PAIN SCALE: 0
LOSS OF SENSATION IN FEET: 1
PAIN SCALE AT LOWEST: 0
ALLEVIATING FACTORS: SUPPORT
ALLEVIATING FACTORS: ICE
ALLEVIATING FACTORS: REST
ALLEVIATING FACTORS: MEDICATIONS
OCCASIONAL FEELINGS OF UNSTEADINESS: 1
EXACERBATED BY: MOVEMENT
DEPRESSION: 0
QUALITY: BURNING
PAIN SCALE AT HIGHEST: 5
ALLEVIATING FACTORS: CHANGE IN POSITION
QUALITY: RADIATING
QUALITY: DULL ACHE

## 2025-05-21 ENCOUNTER — TREATMENT (OUTPATIENT)
Dept: PHYSICAL THERAPY | Facility: CLINIC | Age: 55
End: 2025-05-21
Payer: COMMERCIAL

## 2025-05-21 DIAGNOSIS — M72.2 PLANTAR FASCIITIS: ICD-10-CM

## 2025-05-21 PROCEDURE — 97110 THERAPEUTIC EXERCISES: CPT | Mod: GP | Performed by: PHYSICAL THERAPIST

## 2025-05-21 NOTE — PROGRESS NOTES
Physical Therapy Treatment    Patient Name: Marlon Stoddard  MRN: [id number]  Today's Date: 5/21/2025  Time Calculation  Start Time: 1715  Stop Time: 1743  Time Calculation (min): 28 min  PT Therapeutic Procedures Time Entry  Therapeutic Exercise Time Entry: 28    Insurance:  Visit # 2 of 10  Visits/Dates Authorized: 2025 [facility]: NO AUTH/ $1300 DED (MET)/ 80% COVERAGE/ 30 VISIT MX PER YEAR/ $3900 OOP (NM) PER [facility] SITE REF# [id number]  Insurance Type: Payor: [facility] / Plan: [facility] / Product Type: *No Product type* /    Current Problem   1. Plantar fasciitis  Follow Up In Physical Therapy          Subjective   General   PT arrived 15 minutes late to appointment. MD would like patient to progress to wearing shoes, but unable to tolerate wearing shoe for any longer than 5 minutes. Hasn't done exercises the past few days.       Precautions: Post-op  Pain 4/10  Post Treatment Pain Level 4/10    Objective   Moderate tightness in left gastroc/soleus, increased at muscle belly    Treatments:  Therapeutic Exercise:  NuStep L5 x6 minutes   Gastroc stretch at wedge, 20 seconds x 3  Soleus stretch at foam, 20 seconds x 3  Prostretch rocking 3x10  Ankle DF mobilization at stair 3x10  Heel to toe stepping in clinic 5x5 steps   Inversion/Eversion towel slides in standing 3x10   Step taps 6inch 2x10 R lead for LWB      Assessment   Assessment:   Patient tolerated there ex progressions without increases in pain/symptoms. Focus on general mobility and flexibility of left foot/ankle to take strain off plantar fascia and achilles.         Plan: mobility and strength    OP EDUCATION: importance of performing HEP daily, weaning to boot     Goals:   Active       PT Problem       PT Goals       Start:  05/14/25    Expected End:  08/14/25       STG's      1.  Pt to be independent with HEP in order to manage current condition  2.  Pt will report decreased pain L ankle from 4/10 to </= 1/10 with weight  bearing and daily activities  3.  Pt will increased AROM of L ankle to equal R ankle in order to improve mobility with gait and stairs    LTGs    1.  Pt will increase strength of L LE ankle to 5/5 to equal R ankle in order to improve gait, stair climbing and stability on uneven surfaces    2.  Pt will ambulate with normal gait pattern for community distances to return to walking routine    3.  Pt will improve TUG to < 10 sec in order to demonstrate decreased fall risk    4.  Pt with increase MEHUL from 78% function to 88% function to demonstrate functional limitations          Patient Stated Goal 1       Start:  05/14/25    Expected End:  08/14/25       Pt would like to walk greater than 5 min without having to sit due to pain

## 2025-06-04 ENCOUNTER — TREATMENT (OUTPATIENT)
Dept: PHYSICAL THERAPY | Facility: CLINIC | Age: 55
End: 2025-06-04
Payer: COMMERCIAL

## 2025-06-04 DIAGNOSIS — M72.2 PLANTAR FASCIITIS: ICD-10-CM

## 2025-06-04 PROCEDURE — 97110 THERAPEUTIC EXERCISES: CPT | Mod: GP,CQ

## 2025-06-04 PROCEDURE — 97116 GAIT TRAINING THERAPY: CPT | Mod: GP,CQ

## 2025-06-04 ASSESSMENT — PAIN - FUNCTIONAL ASSESSMENT: PAIN_FUNCTIONAL_ASSESSMENT: 0-10

## 2025-06-04 NOTE — PROGRESS NOTES
"Physical Therapy Treatment    Patient Name: Marlon Stoddard  MRN: [id number]  Today's Date: 6/4/2025  Time Calculation  Start Time: 1545  Stop Time: 1628  Time Calculation (min): 43 min  PT Therapeutic Procedures Time Entry  Therapeutic Exercise Time Entry: 30  Gait Training Time Entry: 13    Insurance:  Visit number: 3 of 10  Authorization info: 2025 [employer]: NO AUTH   Insurance Type: Payor: [facility] / Plan: [facility] / Product Type: *No Product type* /     Current Problem   1. Plantar fasciitis  Follow Up In Physical Therapy          Subjective   General    Patient reports he is using shoes and has weaned off boot, reports most of the time he walks with 2 crutches and walks on \"tippy toes because it doesn't hurt\".     Precautions:   Post op plantar fascial release     Pain   Pain Assessment: 0-10  None    Post Treatment Pain Level   none    Objective   Gait: ataxic and antalgic gait cycle with decrease step length/height on LLE, limited heel strike with ankle eversion upon lift off going into swing. Use of 1 crutch on surgical side.     Weakness through proximal hips B (L>R)    Treatments:  Therapeutic Exercise:  Gluteal bridges 10x2  S/l clamshells 10x2 B  H/l SLR 10x2 B  S/l hip ABD 10x2 B  Prone hip ext with knee bent 10x2 B    Gait:  NuStep level 5 for 6'  Gait training around clinic with 1 crutch (see above for gait)  Pre-gait training LLE NWBing on belt of treadmill, ROM heel strike>toe off with proper gait sequencing for muscle memory 1MHP for 4'.     Assessment   Assessment:    Patient tolerated full session very well, focus on gait training with emphasis on proximal hip strengthening/stability. Significant loss of strength on LLE, but weak bilaterally. Shows significant gait deficits but able to correct using muscle memory technique on treadmill, which he will start doing on treadmill at home to improve fluidity of gait.    Plan:    Progress strengthening/gait    OP EDUCATION:   " Use of 1 crutch, use of treadmill pre-gait heel>toe, HEP updated:   Access Code: [email or web address]      Exercises  - Supine Bridge  - 1 x daily - 7 x weekly - 3 sets - 10 reps  - Clamshell  - 1 x daily - 7 x weekly - 3 sets - 10 reps  - Small Range Straight Leg Raise  - 1 x daily - 7 x weekly - 3 sets - 10 reps  - Sidelying Hip Abduction  - 1 x daily - 7 x weekly - 3 sets - 10 reps  - Prone Hip Extension with Bent Knee  - 1 x daily - 7 x weekly - 3 sets - 10 repsf    Goals:   Active       PT Problem       PT Goals       Start:  05/14/25    Expected End:  08/14/25       STG's      1.  Pt to be independent with HEP in order to manage current condition  2.  Pt will report decreased pain L ankle from 4/10 to </= 1/10 with weight bearing and daily activities  3.  Pt will increased AROM of L ankle to equal R ankle in order to improve mobility with gait and stairs    LTGs    1.  Pt will increase strength of L LE ankle to 5/5 to equal R ankle in order to improve gait, stair climbing and stability on uneven surfaces    2.  Pt will ambulate with normal gait pattern for community distances to return to walking routine    3.  Pt will improve TUG to < 10 sec in order to demonstrate decreased fall risk    4.  Pt with increase MEHUL from 78% function to 88% function to demonstrate functional limitations          Patient Stated Goal 1       Start:  05/14/25    Expected End:  08/14/25       Pt would like to walk greater than 5 min without having to sit due to pain

## 2025-06-05 ENCOUNTER — OFFICE VISIT (OUTPATIENT)
Dept: ORTHOPEDIC SURGERY | Facility: CLINIC | Age: 55
End: 2025-06-05
Payer: COMMERCIAL

## 2025-06-05 DIAGNOSIS — M72.2 PLANTAR FASCIITIS: Primary | ICD-10-CM

## 2025-06-05 PROCEDURE — 99024 POSTOP FOLLOW-UP VISIT: CPT | Performed by: ORTHOPAEDIC SURGERY

## 2025-06-05 PROCEDURE — 1036F TOBACCO NON-USER: CPT | Performed by: ORTHOPAEDIC SURGERY

## 2025-06-05 NOTE — PROGRESS NOTES
Returns for left heel.  Felt a little tearing sensation sitting at the kitchen chair the other night along the heel.  He is not using 1 crutch.  Has therapy scheduled for the next few weeks.  Gets some numbness along the arch.    Exam: Incision well-healed.  Minimal tenderness along the proximal plantar fascia.  Mild tenderness along the midportion plantar fascia.    Assessment: Status post left plantar fascia release.    Plan: Reassured patient.  Continue soft tissue massage with therapy.  Supportive shoe wear.  Wean from crutches as tolerated.  Follow-up in 1 month

## 2025-06-09 ENCOUNTER — TREATMENT (OUTPATIENT)
Dept: PHYSICAL THERAPY | Facility: CLINIC | Age: 55
End: 2025-06-09
Payer: COMMERCIAL

## 2025-06-09 DIAGNOSIS — M72.2 PLANTAR FASCIITIS: ICD-10-CM

## 2025-06-09 PROCEDURE — 97110 THERAPEUTIC EXERCISES: CPT | Mod: GP | Performed by: PHYSICAL THERAPIST

## 2025-06-09 PROCEDURE — 97530 THERAPEUTIC ACTIVITIES: CPT | Mod: GP | Performed by: PHYSICAL THERAPIST

## 2025-06-09 NOTE — PROGRESS NOTES
Physical Therapy Treatment    Patient Name: Marlon Stoddard  MRN: [id number]  Today's Date: 6/9/2025  Time Calculation  Start Time: 1649  Stop Time: 1735  Time Calculation (min): 46 min  PT Therapeutic Procedures Time Entry  Therapeutic Exercise Time Entry: 30  Therapeutic Activity Time Entry: 13    Insurance:  Visit number: 4 of 10  Authorization info: 2025 [employer]: NO AUTH   Insurance Type: Payor: [facility] / Plan: [facility] / Product Type: *No Product type* /     Current Problem   1. Plantar fasciitis  Follow Up In Physical Therapy          Subjective   General    Patient out of boot and no longer walking with crutches. Continues to report discomfort along heel and into fascia intermittently.     Precautions:   Post op plantar fascial release 4/7/25    Pain      None    Post Treatment Pain Level   none    Objective   Gait: reduced L stance time; improved but reduced heel to toe   Reduced L ankle eversion strength   Reduced toe mobility     MMT great toe WNL  MMT ankle eversion/inversion 4+/5 - noted functional weakness     Joint mobility of L foot WNL     Treatments:  Therapeutic Exercise:  TM warm up with L LE only focus on heel to toe 5'   Lateral steps in squat position yellow loop @ toes 2 laps  Standing hip abd with ankle eversion green loop 10x2 B   Standing ankle DF 10x3   Standing heel raise with eccentric lower on L 10x2  Supine isometric great toe 10x3   Seated towel scrunch 10x3  Seated toe yoga 10x3    Therapeutic activity:  Assessment of foot joint mobility   Foot/ankle strength assessment     Assessment   Assessment:    Patient tolerated full session well, focus on standing strengthening as well as toe strengthening for overall stability throughout arch, discussed updated HEP with pt and reason for exercises, will continue as tolerated.     Plan:    Progress strengthening/gait - eversion ankle strength     OP EDUCATION:   Updated HEP:   Access Code: [email or web address]      Goals:    Active       PT Problem       PT Goals       Start:  05/14/25    Expected End:  08/14/25       STG's      1.  Pt to be independent with HEP in order to manage current condition  2.  Pt will report decreased pain L ankle from 4/10 to </= 1/10 with weight bearing and daily activities  3.  Pt will increased AROM of L ankle to equal R ankle in order to improve mobility with gait and stairs    LTGs    1.  Pt will increase strength of L LE ankle to 5/5 to equal R ankle in order to improve gait, stair climbing and stability on uneven surfaces    2.  Pt will ambulate with normal gait pattern for community distances to return to walking routine    3.  Pt will improve TUG to < 10 sec in order to demonstrate decreased fall risk    4.  Pt with increase MEHUL from 78% function to 88% function to demonstrate functional limitations          Patient Stated Goal 1       Start:  05/14/25    Expected End:  08/14/25       Pt would like to walk greater than 5 min without having to sit due to pain

## 2025-06-10 DIAGNOSIS — N52.01 ERECTILE DYSFUNCTION DUE TO ARTERIAL INSUFFICIENCY: ICD-10-CM

## 2025-06-11 RX ORDER — TADALAFIL 20 MG/1
TABLET ORAL
Qty: 30 TABLET | Refills: 2 | Status: SHIPPED | OUTPATIENT
Start: 2025-06-11

## 2025-06-16 ENCOUNTER — TREATMENT (OUTPATIENT)
Dept: PHYSICAL THERAPY | Facility: CLINIC | Age: 55
End: 2025-06-16
Payer: COMMERCIAL

## 2025-06-16 DIAGNOSIS — M72.2 PLANTAR FASCIITIS: ICD-10-CM

## 2025-06-16 PROCEDURE — 97110 THERAPEUTIC EXERCISES: CPT | Mod: GP,CQ

## 2025-06-16 NOTE — PROGRESS NOTES
Physical Therapy Treatment    Patient Name: Marlon Stoddard  MRN: [id number]  Today's Date: 6/16/2025  Time Calculation  Start Time: 1410  Stop Time: 1445  Time Calculation (min): 35 min  PT Therapeutic Procedures Time Entry  Therapeutic Exercise Time Entry: 35    Insurance:  Visit number: 5 of 10  Authorization info: 2025 [employer]: NO AUTH   Insurance Type: Payor: [facility] / Plan: [facility] / Product Type: *No Product type* /     Current Problem   1. Plantar fasciitis  Follow Up In Physical Therapy          Subjective   General    Patient 10 min late to therapy, still able to accommodate. reports continued pain with walking, able to walk for ~3-4 min before he has to stop d/t pain in bottom of boot. Continues to be diligent with HEP which reports are getting slightly easier.     Precautions:   Post fasciotomy   Pain    3/10 L  foot    Post Treatment Pain Level   No change    Objective   Weakness to ankle invertors and evertors     Treatments:  Therapeutic Exercise:  Schwinn 5'  Seated DF with ISO INV LLE x30  Banded squats>calf raise 10x2  Monster rebounds static squat mint TB 5'  Single leg stance with contralateral clocks x 10 ea    K tape to plantarfascia into calf       Assessment   Assessment:    Patient is progressing towards goals, tolerated session well this date with no adverse reactions. Focus on intrinsic foot strengthening through ISO and CKC as well as foot and ankle stability and proximal stability. Will continue to progress strength based tasks to improve fluidity of gait and functional movements.    Plan:    See above    OP EDUCATION:   K tape management    Goals:   Active       PT Problem       PT Goals       Start:  05/14/25    Expected End:  08/14/25       STG's      1.  Pt to be independent with HEP in order to manage current condition  2.  Pt will report decreased pain L ankle from 4/10 to </= 1/10 with weight bearing and daily activities  3.  Pt will increased AROM of  L ankle to equal R ankle in order to improve mobility with gait and stairs    LTGs    1.  Pt will increase strength of L LE ankle to 5/5 to equal R ankle in order to improve gait, stair climbing and stability on uneven surfaces    2.  Pt will ambulate with normal gait pattern for community distances to return to walking routine    3.  Pt will improve TUG to < 10 sec in order to demonstrate decreased fall risk    4.  Pt with increase MEHUL from 78% function to 88% function to demonstrate functional limitations          Patient Stated Goal 1       Start:  05/14/25    Expected End:  08/14/25       Pt would like to walk greater than 5 min without having to sit due to pain

## 2025-06-23 ENCOUNTER — TREATMENT (OUTPATIENT)
Dept: PHYSICAL THERAPY | Facility: CLINIC | Age: 55
End: 2025-06-23
Payer: COMMERCIAL

## 2025-06-23 DIAGNOSIS — M72.2 PLANTAR FASCIITIS: ICD-10-CM

## 2025-06-23 PROCEDURE — 97110 THERAPEUTIC EXERCISES: CPT | Mod: GP,CQ

## 2025-06-23 ASSESSMENT — PAIN SCALES - GENERAL: PAINLEVEL_OUTOF10: 3

## 2025-06-23 ASSESSMENT — PAIN - FUNCTIONAL ASSESSMENT: PAIN_FUNCTIONAL_ASSESSMENT: 0-10

## 2025-06-23 NOTE — PROGRESS NOTES
"Physical Therapy Treatment    Patient Name: Marlon Stoddard  MRN: [id number]  Today's Date: 6/23/2025  Time Calculation  Start Time: 1615  Stop Time: 1653  Time Calculation (min): 38 min  PT Therapeutic Procedures Time Entry  Therapeutic Exercise Time Entry: 38    Insurance:  Visit number: 6 of 10  Authorization info: [facility]: NO AUTH/ $1300 DED (MET)/ 80% COVERAGE/ 30 VISIT MX PER YEAR   Insurance Type: Payor: [facility] / Plan: [facility] / Product Type: *No Product type* /     Current Problem   1. Plantar fasciitis  Follow Up In Physical Therapy          Subjective   General   Patient arrives at therapy with reports of continued pain in his ankle/foot which he reports as an intermittent dull pain, mostly when he is walking. States he was able to walk over 1/2 mile over the weekend and has a lot of pain after, but was able to do it. Has not been diligent with HEP.                                           Precautions:   Post fasciotomy     Pain   Pain Assessment: 0-10  0-10 (Numeric) Pain Score: 3    Post Treatment Pain Level   No change    Objective   Weakness observed to glutes B    Treatments:  Therapeutic Exercise:  Schwinn 5'   Slant board gastroc stretching B 3 x 30\" H   Calf raises on steps with eccentric control 3 x 10  SLS clocks 3x10 ea BLE  Seated resisted ankle INV 10x2 LLE  Seated resisted ankle EVER 10x2 LLE  Supported banded fire hydrants 10x2 medium band B      Assessment   Assessment:    Patient is progressing towards goals, tolerated session well this date with no adverse reactions. However had soreness throughout therex but no significant increase of pain. Fatigued easily today through whole LLE, especially proximally. Will continue to progress BLE CKC and resisted strengthening as able and as appropriate.     Plan:    Foam roll assisted RDL    OP EDUCATION:   Updated and reviewed HEP: access code: [email or web address]     Goals:   Active       PT Problem       PT Goals       Start:  " 05/14/25    Expected End:  08/14/25       STG's      1.  Pt to be independent with HEP in order to manage current condition  2.  Pt will report decreased pain L ankle from 4/10 to </= 1/10 with weight bearing and daily activities  3.  Pt will increased AROM of L ankle to equal R ankle in order to improve mobility with gait and stairs    LTGs    1.  Pt will increase strength of L LE ankle to 5/5 to equal R ankle in order to improve gait, stair climbing and stability on uneven surfaces    2.  Pt will ambulate with normal gait pattern for community distances to return to walking routine    3.  Pt will improve TUG to < 10 sec in order to demonstrate decreased fall risk    4.  Pt with increase MEHUL from 78% function to 88% function to demonstrate functional limitations          Patient Stated Goal 1       Start:  05/14/25    Expected End:  08/14/25       Pt would like to walk greater than 5 min without having to sit due to pain

## 2025-06-30 ENCOUNTER — TREATMENT (OUTPATIENT)
Dept: PHYSICAL THERAPY | Facility: CLINIC | Age: 55
End: 2025-06-30
Payer: COMMERCIAL

## 2025-06-30 DIAGNOSIS — M72.2 PLANTAR FASCIITIS: ICD-10-CM

## 2025-06-30 PROCEDURE — 97110 THERAPEUTIC EXERCISES: CPT | Mod: GP,CQ

## 2025-06-30 ASSESSMENT — PAIN - FUNCTIONAL ASSESSMENT: PAIN_FUNCTIONAL_ASSESSMENT: 0-10

## 2025-06-30 ASSESSMENT — PAIN SCALES - GENERAL: PAINLEVEL_OUTOF10: 4

## 2025-06-30 NOTE — PROGRESS NOTES
"Physical Therapy Treatment    Patient Name: Marlon Stoddard  MRN: [id number]  Today's Date: 6/30/2025  Time Calculation  Start Time: 1635  Stop Time: 1702  Time Calculation (min): 27 min  PT Therapeutic Procedures Time Entry  Therapeutic Exercise Time Entry: 27    Insurance:  Visit number: 7 of 10  Authorization info: 2025 [facility]: NO AUTH/ $1300 DED (MET)/ 80% COVERAGE/ 30 VISIT MX PER YEAR  Insurance Type: Payor: [facility] / Plan: [facility] / Product Type: *No Product type* /     Current Problem   1. Plantar fasciitis  Follow Up In Physical Therapy          Subjective   General    Patient reports he was more diligent with HEP this week and was able to get a lot of yard work done. However, still has most of his issues with walking and has a difficult time walking farther than 5' intervals d/t pain.     Precautions:   Post fasciotomy     Pain   Pain Assessment: 0-10  0-10 (Numeric) Pain Score: 4      Post Treatment Pain Level   No change    Objective   Weakness to proximal hips     Poor single leg balance and proprioception    Treatments:  Therapeutic Exercise:  Schwinn bike war up 5'  Gastroc stretch B on slant board 4 x 30\" H  B calf raise> SL LLE eccentric lowering 10x2  SL RDL with foam roll assist 10x2 B  Soleus stretch B on slant board 4 x 30\" H B  Quadruped fire hydrants 10x2 B    Assessment   Assessment:    Patient is progressing towards goals, tolerated session well this date with no adverse reactions or increased pain. Continues to show signs of weakness proximally and was very challenged by proprioceptive strengthening exercise with signs of instability.     Plan:    Progress CKC strengthening    OP EDUCATION:   Updated HEP: [email or web address]     Goals:   Active       PT Problem       PT Goals       Start:  05/14/25    Expected End:  08/14/25       STG's      1.  Pt to be independent with HEP in order to manage current condition  2.  Pt will report decreased pain L ankle from 4/10 to </= " 1/10 with weight bearing and daily activities  3.  Pt will increased AROM of L ankle to equal R ankle in order to improve mobility with gait and stairs    LTGs    1.  Pt will increase strength of L LE ankle to 5/5 to equal R ankle in order to improve gait, stair climbing and stability on uneven surfaces    2.  Pt will ambulate with normal gait pattern for community distances to return to walking routine    3.  Pt will improve TUG to < 10 sec in order to demonstrate decreased fall risk    4.  Pt with increase MEHUL from 78% function to 88% function to demonstrate functional limitations          Patient Stated Goal 1       Start:  05/14/25    Expected End:  08/14/25       Pt would like to walk greater than 5 min without having to sit due to pain

## 2025-07-07 ENCOUNTER — TREATMENT (OUTPATIENT)
Dept: PHYSICAL THERAPY | Facility: CLINIC | Age: 55
End: 2025-07-07
Payer: COMMERCIAL

## 2025-07-07 DIAGNOSIS — M72.2 PLANTAR FASCIITIS: ICD-10-CM

## 2025-07-07 PROCEDURE — 97110 THERAPEUTIC EXERCISES: CPT | Mod: GP,CQ

## 2025-07-07 NOTE — PROGRESS NOTES
Physical Therapy Treatment    Patient Name: Marlon Stoddard  MRN: [id number]  Today's Date: 7/7/2025  Time Calculation  Start Time: 1645  Stop Time: 1715  Time Calculation (min): 30 min  PT Therapeutic Procedures Time Entry  Therapeutic Exercise Time Entry: 25    Insurance:  Visit number: 8 of 10  Authorization info: 2025 [facility]: NO AUTH/ $1300 DED (MET)/ 80% COVERAGE/ 30 VISIT MX PER YEAR   Insurance Type: Payor: [facility] / Plan: [facility] / Product Type: *No Product type* /     Current Problem   1. Plantar fasciitis  Follow Up In Physical Therapy          Subjective   General    Patient reports continued pain with standing and walking still. States he has no pain at rest but when he's on his feet for longer than 5 min he has increased pain. States he is diligent with HEP.     Precautions:   Post fasciotomy     Pain    None    Post Treatment Pain Level   No change    Objective   Varus at knees B with all CKC tasks    Highly challenged by SLS tasks with occasional LOB    Treatments:  Therapeutic Exercise:  Schwinn bike 5'  Rebound lunge with hip hike B 10x2  Calf raise>L SL eccentric lower 10x2  SLS with weight transfers 3@ DB B x10 ea  Quadruped fire hydrants GTB 10x2 B    Dry needling    K tape to plantarfascia, no resistance    Assessment   Assessment:    Patient is progressing towards goals, tolerated session well this date with no adverse reactions. Continues to have lack of single leg balance with overall lack of LLE stability and proprioception. Improved hip strengthening, however still fatigued with all tasks.     Plan:    Progress strengthening    OP EDUCATION:   Dry needle and K tape management/precautions    Goals:   Active       PT Problem       PT Goals       Start:  05/14/25    Expected End:  08/14/25       STG's      1.  Pt to be independent with HEP in order to manage current condition  2.  Pt will report decreased pain L ankle from 4/10 to </= 1/10 with weight bearing and  daily activities  3.  Pt will increased AROM of L ankle to equal R ankle in order to improve mobility with gait and stairs    LTGs    1.  Pt will increase strength of L LE ankle to 5/5 to equal R ankle in order to improve gait, stair climbing and stability on uneven surfaces    2.  Pt will ambulate with normal gait pattern for community distances to return to walking routine    3.  Pt will improve TUG to < 10 sec in order to demonstrate decreased fall risk    4.  Pt with increase MEHUL from 78% function to 88% function to demonstrate functional limitations          Patient Stated Goal 1       Start:  05/14/25    Expected End:  08/14/25       Pt would like to walk greater than 5 min without having to sit due to pain

## 2025-07-14 ENCOUNTER — APPOINTMENT (OUTPATIENT)
Dept: PHYSICAL THERAPY | Facility: CLINIC | Age: 55
End: 2025-07-14
Payer: COMMERCIAL

## 2025-07-14 NOTE — PROGRESS NOTES
Physical Therapy Treatment    Patient Name: Marlon Stoddard  MRN: [id number]  Today's Date: 7/14/2025       Insurance:  Visit number: 9 of ***  Authorization info: ***  Insurance Type: Payor: [facility] / Plan: [facility] / Product Type: *No Product type* /     Current Problem   No diagnosis found.    Subjective   General      Precautions:     Pain      Post Treatment Pain Level ***    Objective   ***  Pain Assessment: 0-10  0-10 (Numeric) Pain Score: 4  Objective      Observations   Left Ankle/Foot   Positive for edema and incision.      Additional Observation Details  Incision is well healed     Palpation      Additional Palpation Details  Pt with sensitivity plantar surface of foot and through dorsal aspect of 2nd and 3rd digits     Active Range of Motion   Left Ankle/Foot   Dorsiflexion (ke): 10 degrees   Dorsiflexion (kf): 12 degrees   Plantar flexion: 40 degrees   Inversion: 10 degrees   Eversion: 20 degrees      Right Ankle/Foot   Dorsiflexion (ke): 12 degrees   Dorsiflexion (kf): 12 degrees   Plantar flexion: 65 degrees   Inversion: 23 degrees   Eversion: 20 degrees      Strength/Myotome Testing      Left Ankle/Foot   Dorsiflexion: 4  Plantar flexion: 4-  Inversion: 4-  Eversion: 4-     Right Ankle/Foot   Dorsiflexion: 5  Plantar flexion: 5  Inversion: 5  Eversion: 5     Swelling   Left Ankle/Foot   Figure 8: 67 cm     Right Ankle/Foot   Figure 8: 66.2 cm               Outcome Measures:    MEHUL 78%  Treatments:  Therapeutic Exercise:     Therapeutic activity:     Neuro Re-ed:      Manual:     Gait:     Stair:     Transfer:      Modalities  {Modalities Used:89642}     Assessment   Assessment:        Plan:        OP EDUCATION:       Goals:   Active       PT Problem       PT Goals       Start:  05/14/25    Expected End:  08/14/25       STG's      1.  Pt to be independent with HEP in order to manage current condition  2.  Pt will report decreased pain L ankle from 4/10 to </= 1/10 with  weight bearing and daily activities  3.  Pt will increased AROM of L ankle to equal R ankle in order to improve mobility with gait and stairs    LTGs    1.  Pt will increase strength of L LE ankle to 5/5 to equal R ankle in order to improve gait, stair climbing and stability on uneven surfaces    2.  Pt will ambulate with normal gait pattern for community distances to return to walking routine    3.  Pt will improve TUG to < 10 sec in order to demonstrate decreased fall risk    4.  Pt with increase MEHUL from 78% function to 88% function to demonstrate functional limitations          Patient Stated Goal 1       Start:  05/14/25    Expected End:  08/14/25       Pt would like to walk greater than 5 min without having to sit due to pain

## 2025-07-17 ENCOUNTER — APPOINTMENT (OUTPATIENT)
Dept: ORTHOPEDIC SURGERY | Facility: CLINIC | Age: 55
End: 2025-07-17
Payer: COMMERCIAL

## 2025-07-21 ENCOUNTER — TREATMENT (OUTPATIENT)
Dept: PHYSICAL THERAPY | Facility: CLINIC | Age: 55
End: 2025-07-21
Payer: COMMERCIAL

## 2025-07-21 ENCOUNTER — APPOINTMENT (OUTPATIENT)
Dept: ORTHOPEDIC SURGERY | Facility: CLINIC | Age: 55
End: 2025-07-21
Payer: COMMERCIAL

## 2025-07-21 DIAGNOSIS — M72.2 PLANTAR FASCIITIS: ICD-10-CM

## 2025-07-21 DIAGNOSIS — M72.2 PLANTAR FASCIITIS: Primary | ICD-10-CM

## 2025-07-21 PROCEDURE — 97110 THERAPEUTIC EXERCISES: CPT | Mod: GP | Performed by: PHYSICAL THERAPIST

## 2025-07-21 PROCEDURE — 99213 OFFICE O/P EST LOW 20 MIN: CPT | Performed by: ORTHOPAEDIC SURGERY

## 2025-07-21 ASSESSMENT — PAIN SCALES - GENERAL: PAINLEVEL_OUTOF10: 4

## 2025-07-21 ASSESSMENT — PAIN - FUNCTIONAL ASSESSMENT
PAIN_FUNCTIONAL_ASSESSMENT: 0-10
PAIN_FUNCTIONAL_ASSESSMENT: 0-10

## 2025-07-21 NOTE — PROGRESS NOTES
Returns for left foot.  Still working with therapy once a week.  Getting some pain along the arch.  Heel pain is improving.  Has tried his old custom orthotics without relief.  Just got new [employer] shoes.    Exam: Incision well-healed.  Minimal tenderness along the proximal plantar fascia.  Some tenderness along the midportion plantar fascia.  No nodule.    Assessment: Status post left plantar fascia release.    Plan: Continue supportive shoewear.  Progress activity as tolerated.  No restrictions.  Try Superfeet over-the-counter orthotics for better arch support.

## 2025-07-21 NOTE — PROGRESS NOTES
Physical Therapy Treatment/Progress Note    Patient Name: Marlon Stoddard  MRN: [id number]  Today's Date: 7/21/2025  Time Calculation  Start Time: 1710  Stop Time: 1758  Time Calculation (min): 48 min  PT Therapeutic Procedures Time Entry  Therapeutic Exercise Time Entry: 40    Insurance:  Visit number: 9 of 13  Authorization info: 2025 [facility]: NO AUTH/ $1300 DED (MET)/ 80% COVERAGE/ 30 VISIT MX PER YEAR/ $3900 OOP (NM) PER [facility] SITE REF# [id number]  Insurance Type: Payor: [facility] / Plan: [place] [facility] / Product Type: *No Product type* /     Current Problem   1. Plantar fasciitis  Follow Up In Physical Therapy          Subjective   General   Reason for Referral: S/P L plantar fasciotomy  Referred By: Dr Ivan  Past Medical History Relevant to Rehab: PMH HTN, Gatrocnemius equinus pantar fasciitis, GERD, BPH  Precautions:   S/p L plantar fasciotomy  Pain   Pain Assessment: 0-10  0-10 (Numeric) Pain Score: 4  Pain Location: Foot  Pain Orientation: Left  Pain Frequency:  (Pain with prolonged walking)  Post Treatment Pain Level Pt states that he is good    Objective   Pt ambulating without device  Objective      Observations   Left Ankle/Foot   Positive for edema and incision.      Additional Observation Details  Incision is well healed     Palpation      Pt with tenderness medial arch and plantar surface of foot just under met heads     Active Range of Motion   Left Ankle/Foot   Dorsiflexion (ke): 10 degrees ----12 degrees  Dorsiflexion (kf): 12 degrees  ---14 degrees  Plantar flexion: 40 degrees ---55 degrees  Inversion: 10 degrees ----23 degrees  Eversion: 20 degrees ------30 degrees     Right Ankle/Foot   Dorsiflexion (ke): 12 degrees   Dorsiflexion (kf): 12 degrees   Plantar flexion: 65 degrees   Inversion: 23 degrees   Eversion: 20 degrees      Strength/Myotome Testing      Left Ankle/Foot   Dorsiflexion: 4------5  Plantar flexion: 4-  -----5  Inversion: 4-  ---5  Eversion: 4- -----5    "  Right Ankle/Foot   Dorsiflexion: 5  Plantar flexion: 5  Inversion: 5  Eversion: 5     Swelling   Left Ankle/Foot   Figure 8: 67 cm-----67 cm     Right Ankle/Foot   Figure 8: 66.2 cm               Outcome Measures:    MEHUL 78%------83%  Treatments:  Therapeutic Exercise:  Therapeutic Exercise  Therapeutic Exercise Performed: Yes  Therapeutic Exercise Activity 1: [employer] bike 5 min with  discussion of current status  Therapeutic Exercise Activity 2: HC stetch on incline x3 20 ct hold  Therapeutic Exercise Activity 3: HR off of step with Eccentric lowering 2 x10  Therapeutic Exercise Activity 4: toe flex passive stretch x10 10 ct hold and toe ext stretch passive x10 10 ct hold  Therapeutic Exercise Activity 5: standing step with R LE moving from foot flat to push off phase of gait x20  Therapeutic Exercise Activity 6: MMT  Therapeutic Exercise Activity 7: AROM figure 8  Therapeutic Exercise Activity 8: MEHUL  Therapeutic Exercise Activity 9: K tape to L foot for support/relief       : DN 3 15 gauge needles to plantar surface of foot.  1 15 mm gauge to medial arch.  and 1 15 mm gauge needle to deep fibular nerve.      Assessment   Assessment:   PT Assessment  Assessment Comment: Pt making progress towards goals.  Pt with improvedAROM L foot/ankle, increased strength, better gait pattern, and improved MEHUL score.  Pt still experiencing pain during wlaking distances or standing for extended periods of time.  Pt with good pain reduction with DN and K tape.    Plan:    Continue with stretching, mobilization toe flexion over 2\" step gentle over ball, SLS activities for balance    OP EDUCATION:  Outpatient Education  Individual(s) Educated: Patient  Education Provided: Body Mechanics, POC  Patient/Caregiver Demonstrated Understanding: yes  Plan of Care Discussed and Agreed Upon: yes    Goals:   Active       PT Problem       PT Goals       Start:  05/14/25    Expected End:  08/14/25       STG's      1.  Pt to be independent " with HEP in order to manage current condition PROGRESSING  2.  Pt will report decreased pain L ankle from 4/10 to </= 1/10 with weight bearing and daily activities PROGRESSING not consistent  3.  Pt will increased AROM of L ankle to equal R ankle in order to improve mobility with gait and stairs MET    LTGs    1.  Pt will increase strength of L LE ankle to 5/5 to equal R ankle in order to improve gait, stair climbing and stability on uneven surfaces MET    2.  Pt will ambulate with normal gait pattern for community distances to return to walking routine PROGRESSING    3.  Pt will improve TUG to < 10 sec in order to demonstrate decreased fall risk    4.  Pt with increase MEHUL from 78% function to 88% function to demonstrate functional limitations PROGRESSING 83% function         Patient Stated Goal 1 (Progressing)       Start:  05/14/25    Expected End:  08/14/25       Pt would like to walk greater than 5 min without having to sit due to pain

## 2025-07-28 ENCOUNTER — APPOINTMENT (OUTPATIENT)
Dept: PHYSICAL THERAPY | Facility: CLINIC | Age: 55
End: 2025-07-28
Payer: COMMERCIAL

## 2025-08-04 ENCOUNTER — HOSPITAL ENCOUNTER (OUTPATIENT)
Dept: RADIOLOGY | Facility: HOSPITAL | Age: 55
Discharge: HOME | End: 2025-08-04
Payer: COMMERCIAL

## 2025-08-04 DIAGNOSIS — G57.52 TARSAL TUNNEL SYNDROME, LEFT LOWER LIMB: ICD-10-CM

## 2025-08-04 DIAGNOSIS — M72.2 PLANTAR FASCIAL FIBROMATOSIS: ICD-10-CM

## 2025-08-04 PROCEDURE — 73721 MRI JNT OF LWR EXTRE W/O DYE: CPT | Mod: LEFT SIDE | Performed by: RADIOLOGY

## 2025-08-04 PROCEDURE — 73721 MRI JNT OF LWR EXTRE W/O DYE: CPT | Mod: LT

## 2025-08-07 ENCOUNTER — APPOINTMENT (OUTPATIENT)
Dept: ORTHOPEDIC SURGERY | Facility: CLINIC | Age: 55
End: 2025-08-07
Payer: COMMERCIAL

## 2025-08-18 ENCOUNTER — APPOINTMENT (OUTPATIENT)
Dept: RADIOLOGY | Facility: HOSPITAL | Age: 55
End: 2025-08-18
Payer: COMMERCIAL

## (undated) DEVICE — GLOVE, SURGICAL, PROTEXIS PI MICRO, 8.0, PF, LF

## (undated) DEVICE — BANDAGE, STRETCH, CONFORM, 2 IN X 4.1 YD, STERILE

## (undated) DEVICE — SUTURE, ETHILON, 4-0, BLK, MONO, PS-2 18

## (undated) DEVICE — GLOVE, SURGICAL, PROTEXIS PI W/NEU-THERA, 8.0, PF, LF

## (undated) DEVICE — COVER, MAYO STAND, W/PAD, 23 IN, DISPOSABLE, PLASTIC, LF, STERILE

## (undated) DEVICE — CUFF, TOURNIQUET, 18 X 4, DUAL PORT/SNGL BLADDER, DISP, LF

## (undated) DEVICE — TOWEL, SURGICAL, NEURO, O/R, 16 X 26, BLUE, STERILE

## (undated) DEVICE — Device